# Patient Record
Sex: FEMALE | Race: OTHER | HISPANIC OR LATINO
[De-identification: names, ages, dates, MRNs, and addresses within clinical notes are randomized per-mention and may not be internally consistent; named-entity substitution may affect disease eponyms.]

---

## 2018-04-12 ENCOUNTER — APPOINTMENT (OUTPATIENT)
Dept: HUMAN REPRODUCTION | Facility: CLINIC | Age: 33
End: 2018-04-12
Payer: COMMERCIAL

## 2018-04-12 PROCEDURE — 99215 OFFICE O/P EST HI 40 MIN: CPT | Mod: 25

## 2018-04-12 PROCEDURE — 76830 TRANSVAGINAL US NON-OB: CPT

## 2018-04-12 PROCEDURE — 36415 COLL VENOUS BLD VENIPUNCTURE: CPT

## 2018-04-25 ENCOUNTER — EMERGENCY (EMERGENCY)
Facility: HOSPITAL | Age: 33
LOS: 0 days | Discharge: HOME | End: 2018-04-25
Attending: EMERGENCY MEDICINE | Admitting: EMERGENCY MEDICINE
Payer: COMMERCIAL

## 2018-04-25 VITALS
HEIGHT: 63 IN | TEMPERATURE: 98 F | OXYGEN SATURATION: 99 % | SYSTOLIC BLOOD PRESSURE: 123 MMHG | DIASTOLIC BLOOD PRESSURE: 69 MMHG | RESPIRATION RATE: 18 BRPM | HEART RATE: 67 BPM | WEIGHT: 160.06 LBS

## 2018-04-25 VITALS — DIASTOLIC BLOOD PRESSURE: 72 MMHG | HEART RATE: 68 BPM | SYSTOLIC BLOOD PRESSURE: 125 MMHG

## 2018-04-25 DIAGNOSIS — M54.5 LOW BACK PAIN: ICD-10-CM

## 2018-04-25 DIAGNOSIS — M79.661 PAIN IN RIGHT LOWER LEG: ICD-10-CM

## 2018-04-25 DIAGNOSIS — G89.11 ACUTE PAIN DUE TO TRAUMA: ICD-10-CM

## 2018-04-25 DIAGNOSIS — M54.40 LUMBAGO WITH SCIATICA, UNSPECIFIED SIDE: ICD-10-CM

## 2018-04-25 DIAGNOSIS — Z04.1 ENCOUNTER FOR EXAMINATION AND OBSERVATION FOLLOWING TRANSPORT ACCIDENT: ICD-10-CM

## 2018-04-25 DIAGNOSIS — M79.662 PAIN IN LEFT LOWER LEG: ICD-10-CM

## 2018-04-25 PROCEDURE — 73590 X-RAY EXAM OF LOWER LEG: CPT | Mod: 26,RT

## 2018-04-25 PROCEDURE — 72131 CT LUMBAR SPINE W/O DYE: CPT | Mod: 26

## 2018-04-25 PROCEDURE — 99284 EMERGENCY DEPT VISIT MOD MDM: CPT

## 2018-04-25 RX ORDER — CYCLOBENZAPRINE HYDROCHLORIDE 10 MG/1
10 TABLET, FILM COATED ORAL ONCE
Qty: 0 | Refills: 0 | Status: COMPLETED | OUTPATIENT
Start: 2018-04-25 | End: 2018-04-25

## 2018-04-25 RX ORDER — IBUPROFEN 200 MG
600 TABLET ORAL ONCE
Qty: 0 | Refills: 0 | Status: COMPLETED | OUTPATIENT
Start: 2018-04-25 | End: 2018-04-25

## 2018-04-25 RX ORDER — CYCLOBENZAPRINE HYDROCHLORIDE 10 MG/1
1 TABLET, FILM COATED ORAL
Qty: 15 | Refills: 0
Start: 2018-04-25

## 2018-04-25 RX ORDER — IBUPROFEN 200 MG
1 TABLET ORAL
Qty: 30 | Refills: 0
Start: 2018-04-25

## 2018-04-25 RX ORDER — LIDOCAINE 4 G/100G
1 CREAM TOPICAL ONCE
Qty: 0 | Refills: 0 | Status: COMPLETED | OUTPATIENT
Start: 2018-04-25 | End: 2018-04-25

## 2018-04-25 RX ADMIN — CYCLOBENZAPRINE HYDROCHLORIDE 10 MILLIGRAM(S): 10 TABLET, FILM COATED ORAL at 10:52

## 2018-04-25 RX ADMIN — LIDOCAINE 1 PATCH: 4 CREAM TOPICAL at 10:45

## 2018-04-25 RX ADMIN — Medication 600 MILLIGRAM(S): at 10:45
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
## 2018-04-25 NOTE — ED PROVIDER NOTE - NEUROLOGICAL, MLM
No focal deficits, no motor or sensory deficits. MAEx4. No focal deficits, no motor or sensory deficits. MAEx4 with 5/5 strength throughout, Cranial II - XII intact. No decreased sensation. No focal deficits, no motor or sensory deficits. MAEx4 with 5/5 strength throughout, Cranial II - XII intact. No decreased sensation. Nml gait.

## 2018-04-25 NOTE — ED PROVIDER NOTE - PROGRESS NOTE DETAILS
AJML: pt improved. walking well. workup neg. dc home. All results discussed with the patient, and a copy of results has been provided. Patient is comfortable with dc plan for home. Opportunity for questions was provided and all questions have been adressed.

## 2018-05-14 ENCOUNTER — APPOINTMENT (OUTPATIENT)
Dept: HUMAN REPRODUCTION | Facility: CLINIC | Age: 33
End: 2018-05-14

## 2018-09-04 ENCOUNTER — EMERGENCY (EMERGENCY)
Facility: HOSPITAL | Age: 33
LOS: 0 days | Discharge: HOME | End: 2018-09-05
Attending: EMERGENCY MEDICINE | Admitting: EMERGENCY MEDICINE
Payer: COMMERCIAL

## 2018-09-04 VITALS
TEMPERATURE: 99 F | HEIGHT: 63 IN | DIASTOLIC BLOOD PRESSURE: 42 MMHG | WEIGHT: 145.06 LBS | SYSTOLIC BLOOD PRESSURE: 117 MMHG | HEART RATE: 63 BPM | RESPIRATION RATE: 18 BRPM | OXYGEN SATURATION: 100 %

## 2018-09-04 NOTE — ED ADULT TRIAGE NOTE - CHIEF COMPLAINT QUOTE
pt presents to Ed with complaints of right foot pain. pt states pain started suddenly at approximately 1930 this evening. pt did not take medication for pain, but did apply ice. pt states pain started while standing. denies trauma to area.

## 2018-09-05 PROCEDURE — 73610 X-RAY EXAM OF ANKLE: CPT | Mod: 26,RT

## 2018-09-05 PROCEDURE — 99284 EMERGENCY DEPT VISIT MOD MDM: CPT | Mod: 25

## 2018-09-05 PROCEDURE — 73630 X-RAY EXAM OF FOOT: CPT | Mod: 26,RT

## 2018-09-05 RX ORDER — IBUPROFEN 200 MG
600 TABLET ORAL ONCE
Qty: 0 | Refills: 0 | Status: COMPLETED | OUTPATIENT
Start: 2018-09-05 | End: 2018-09-05

## 2018-09-05 RX ADMIN — Medication 600 MILLIGRAM(S): at 00:30

## 2018-09-05 NOTE — ED ADULT NURSE NOTE - NSIMPLEMENTINTERV_GEN_ALL_ED
Implemented All Universal Safety Interventions:  [place] to call system. Call bell, personal items and telephone within reach. Instruct patient to call for assistance. Room bathroom lighting operational. Non-slip footwear when patient is off stretcher. Physically safe environment: no spills, clutter or unnecessary equipment. Stretcher in lowest position, wheels locked, appropriate side rails in place.

## 2018-09-05 NOTE — ED ADULT NURSE NOTE - OBJECTIVE STATEMENT
pt complains of right foot pain. pt did not fall or injury foot. pt states she did not workout recently. no deformity or edema noted. positive pedal pulses 10/10 pain. states it started hurting around 7pm tonight while standing at work

## 2018-09-05 NOTE — ED PROVIDER NOTE - OBJECTIVE STATEMENT
34 y/o female in ED c/o sudden onset of right foot pain x 5 hours while standing.   pt states that she is a  and stands all day.  no meds taken for pain.   pt denies any trauma.   denies any other complaints.

## 2018-09-06 DIAGNOSIS — M79.671 PAIN IN RIGHT FOOT: ICD-10-CM

## 2018-10-09 ENCOUNTER — EMERGENCY (EMERGENCY)
Facility: HOSPITAL | Age: 33
LOS: 0 days | Discharge: HOME | End: 2018-10-09
Attending: EMERGENCY MEDICINE | Admitting: EMERGENCY MEDICINE
Payer: COMMERCIAL

## 2018-10-09 VITALS
HEART RATE: 56 BPM | RESPIRATION RATE: 18 BRPM | OXYGEN SATURATION: 100 % | SYSTOLIC BLOOD PRESSURE: 108 MMHG | DIASTOLIC BLOOD PRESSURE: 46 MMHG | TEMPERATURE: 99 F

## 2018-10-09 VITALS — HEIGHT: 63 IN | WEIGHT: 143.08 LBS

## 2018-10-09 DIAGNOSIS — R94.31 ABNORMAL ELECTROCARDIOGRAM [ECG] [EKG]: ICD-10-CM

## 2018-10-09 DIAGNOSIS — R06.00 DYSPNEA, UNSPECIFIED: ICD-10-CM

## 2018-10-09 DIAGNOSIS — R07.9 CHEST PAIN, UNSPECIFIED: ICD-10-CM

## 2018-10-09 DIAGNOSIS — R06.02 SHORTNESS OF BREATH: ICD-10-CM

## 2018-10-09 LAB
ALBUMIN SERPL ELPH-MCNC: 3.6 G/DL — SIGNIFICANT CHANGE UP (ref 3.3–5)
ALP SERPL-CCNC: 48 U/L — SIGNIFICANT CHANGE UP (ref 40–120)
ALT FLD-CCNC: 24 U/L — SIGNIFICANT CHANGE UP (ref 12–78)
ANION GAP SERPL CALC-SCNC: 6 MMOL/L — SIGNIFICANT CHANGE UP (ref 5–17)
ANISOCYTOSIS BLD QL: SLIGHT — SIGNIFICANT CHANGE UP
APPEARANCE UR: CLEAR — SIGNIFICANT CHANGE UP
AST SERPL-CCNC: 17 U/L — SIGNIFICANT CHANGE UP (ref 15–37)
BASOPHILS # BLD AUTO: 0.03 K/UL — SIGNIFICANT CHANGE UP (ref 0–0.2)
BASOPHILS NFR BLD AUTO: 0.3 % — SIGNIFICANT CHANGE UP (ref 0–2)
BILIRUB SERPL-MCNC: 0.8 MG/DL — SIGNIFICANT CHANGE UP (ref 0.2–1.2)
BILIRUB UR-MCNC: NEGATIVE — SIGNIFICANT CHANGE UP
BUN SERPL-MCNC: 9 MG/DL — SIGNIFICANT CHANGE UP (ref 7–23)
CALCIUM SERPL-MCNC: 8.6 MG/DL — SIGNIFICANT CHANGE UP (ref 8.5–10.1)
CHLORIDE SERPL-SCNC: 107 MMOL/L — SIGNIFICANT CHANGE UP (ref 96–108)
CO2 SERPL-SCNC: 30 MMOL/L — SIGNIFICANT CHANGE UP (ref 22–31)
COLOR SPEC: YELLOW — SIGNIFICANT CHANGE UP
CREAT SERPL-MCNC: 0.58 MG/DL — SIGNIFICANT CHANGE UP (ref 0.5–1.3)
D DIMER BLD IA.RAPID-MCNC: <150 NG/ML DDU — SIGNIFICANT CHANGE UP
DIFF PNL FLD: NEGATIVE — SIGNIFICANT CHANGE UP
EOSINOPHIL # BLD AUTO: 0.09 K/UL — SIGNIFICANT CHANGE UP (ref 0–0.5)
EOSINOPHIL NFR BLD AUTO: 1 % — SIGNIFICANT CHANGE UP (ref 0–6)
GLUCOSE SERPL-MCNC: 83 MG/DL — SIGNIFICANT CHANGE UP (ref 70–99)
GLUCOSE UR QL: NEGATIVE MG/DL — SIGNIFICANT CHANGE UP
HCT VFR BLD CALC: 31.3 % — LOW (ref 34.5–45)
HGB BLD-MCNC: 11.5 G/DL — SIGNIFICANT CHANGE UP (ref 11.5–15.5)
HYPOCHROMIA BLD QL: SIGNIFICANT CHANGE UP
IMM GRANULOCYTES NFR BLD AUTO: 0.4 % — SIGNIFICANT CHANGE UP (ref 0–1.5)
KETONES UR-MCNC: NEGATIVE — SIGNIFICANT CHANGE UP
LEUKOCYTE ESTERASE UR-ACNC: NEGATIVE — SIGNIFICANT CHANGE UP
LYMPHOCYTES # BLD AUTO: 2.18 K/UL — SIGNIFICANT CHANGE UP (ref 1–3.3)
LYMPHOCYTES # BLD AUTO: 24.4 % — SIGNIFICANT CHANGE UP (ref 13–44)
MACROCYTES BLD QL: SLIGHT — SIGNIFICANT CHANGE UP
MANUAL SMEAR VERIFICATION: SIGNIFICANT CHANGE UP
MCHC RBC-ENTMCNC: 22.9 PG — LOW (ref 27–34)
MCHC RBC-ENTMCNC: 36.7 GM/DL — HIGH (ref 32–36)
MCV RBC AUTO: 62.4 FL — LOW (ref 80–100)
MICROCYTES BLD QL: SLIGHT — SIGNIFICANT CHANGE UP
MONOCYTES # BLD AUTO: 0.5 K/UL — SIGNIFICANT CHANGE UP (ref 0–0.9)
MONOCYTES NFR BLD AUTO: 5.6 % — SIGNIFICANT CHANGE UP (ref 2–14)
NEUTROPHILS # BLD AUTO: 6.09 K/UL — SIGNIFICANT CHANGE UP (ref 1.8–7.4)
NEUTROPHILS NFR BLD AUTO: 68.3 % — SIGNIFICANT CHANGE UP (ref 43–77)
NITRITE UR-MCNC: NEGATIVE — SIGNIFICANT CHANGE UP
NRBC # BLD: 0 /100 WBCS — SIGNIFICANT CHANGE UP (ref 0–0)
PH UR: 7 — SIGNIFICANT CHANGE UP (ref 5–8)
PLAT MORPH BLD: NORMAL — SIGNIFICANT CHANGE UP
PLATELET # BLD AUTO: 191 K/UL — SIGNIFICANT CHANGE UP (ref 150–400)
POLYCHROMASIA BLD QL SMEAR: SLIGHT — SIGNIFICANT CHANGE UP
POTASSIUM SERPL-MCNC: 4.2 MMOL/L — SIGNIFICANT CHANGE UP (ref 3.5–5.3)
POTASSIUM SERPL-SCNC: 4.2 MMOL/L — SIGNIFICANT CHANGE UP (ref 3.5–5.3)
PROT SERPL-MCNC: 6.9 GM/DL — SIGNIFICANT CHANGE UP (ref 6–8.3)
PROT UR-MCNC: NEGATIVE MG/DL — SIGNIFICANT CHANGE UP
RBC # BLD: 5.02 M/UL — SIGNIFICANT CHANGE UP (ref 3.8–5.2)
RBC # FLD: 15.5 % — HIGH (ref 10.3–14.5)
RBC BLD AUTO: ABNORMAL
SODIUM SERPL-SCNC: 143 MMOL/L — SIGNIFICANT CHANGE UP (ref 135–145)
SP GR SPEC: 1.01 — SIGNIFICANT CHANGE UP (ref 1.01–1.02)
TARGETS BLD QL SMEAR: SIGNIFICANT CHANGE UP
UROBILINOGEN FLD QL: 1 MG/DL
WBC # BLD: 8.93 K/UL — SIGNIFICANT CHANGE UP (ref 3.8–10.5)
WBC # FLD AUTO: 8.93 K/UL — SIGNIFICANT CHANGE UP (ref 3.8–10.5)

## 2018-10-09 PROCEDURE — 93010 ELECTROCARDIOGRAM REPORT: CPT

## 2018-10-09 PROCEDURE — 99285 EMERGENCY DEPT VISIT HI MDM: CPT

## 2018-10-09 PROCEDURE — 71045 X-RAY EXAM CHEST 1 VIEW: CPT | Mod: 26

## 2018-10-09 RX ORDER — IPRATROPIUM/ALBUTEROL SULFATE 18-103MCG
3 AEROSOL WITH ADAPTER (GRAM) INHALATION ONCE
Qty: 0 | Refills: 0 | Status: DISCONTINUED | OUTPATIENT
Start: 2018-10-09 | End: 2018-10-09

## 2018-10-09 RX ORDER — SODIUM CHLORIDE 9 MG/ML
1000 INJECTION INTRAMUSCULAR; INTRAVENOUS; SUBCUTANEOUS ONCE
Qty: 0 | Refills: 0 | Status: DISCONTINUED | OUTPATIENT
Start: 2018-10-09 | End: 2018-10-09

## 2018-10-09 RX ORDER — KETOROLAC TROMETHAMINE 30 MG/ML
30 SYRINGE (ML) INJECTION ONCE
Qty: 0 | Refills: 0 | Status: DISCONTINUED | OUTPATIENT
Start: 2018-10-09 | End: 2018-10-09

## 2018-10-09 RX ADMIN — Medication 30 MILLIGRAM(S): at 13:22

## 2018-10-09 NOTE — ED ADULT TRIAGE NOTE - CHIEF COMPLAINT QUOTE
pt c/o back pain x 3 days. dx w/ pilonidal cyst at urgent care pt presents to ED c/o L sided rib pain and sob x 2 days denies trauma/injury

## 2018-10-09 NOTE — ED ADULT NURSE NOTE - NSIMPLEMENTINTERV_GEN_ALL_ED
Implemented All Universal Safety Interventions:  [place] to call system. Call bell, personal items and telephone within reach. Instruct patient to call for assistance. Room bathroom lighting operational. Non-slip footwear when patient is off stretcher. Physically safe environment: no spills, clutter or unnecessary equipment. Stretcher in lowest position, wheels locked, appropriate side rails in place.

## 2018-10-09 NOTE — ED STATDOCS - CARE PLAN
Principal Discharge DX:	Dyspnea, unspecified type  Secondary Diagnosis:	Chest pain, unspecified type

## 2018-10-09 NOTE — ED STATDOCS - MUSCULOSKELETAL, MLM
range of motion is not limited. TTP left chest wall. range of motion is not limited. mild TTP left chest wall.

## 2018-10-09 NOTE — ED STATDOCS - OBJECTIVE STATEMENT
32 y/o female with no significant PMHx presents to the ED c/o SOB x3 days. +cough, +pain under left breast. Pt was sent to ED by chiropractor for SOB. Has not taken any medication for pain. Occasional EtOH use. Smoker. Occasional Marijuana use. NKDA. LNMP: 2 weeks ago.

## 2018-10-09 NOTE — ED STATDOCS - ATTENDING CONTRIBUTION TO CARE
I, Valencia Darling MD,  performed the initial face to face bedside interview with this patient regarding history of present illness, review of symptoms and relevant past medical, social and family history.  I completed an independent physical examination.  I was the initial provider who evaluated this patient. I have signed out the follow up of any pending tests (i.e. labs, radiological studies) to the ACP.  I have communicated the patient’s plan of care and disposition with the ACP.  The history, relevant review of systems, past medical and surgical history, medical decision making, and physical examination was documented by the scribe in my presence and I attest to the accuracy of the documentation.

## 2018-10-09 NOTE — ED STATDOCS - PROGRESS NOTE DETAILS
signed Melissa Juarez PA-C Pt seen initially in intake by Dr. Darling signed Melissa Juarez PA-C Pt seen initially in intake by Dr. Darling  33F sent for eval for left sided CP and SOB for a few days. pt notes the discomfort is worsened when she moved her LUE around. No significant findings on imaging, or EKG. Labs show mild anemia, pt notes she has sickle cell trait.  recommend outpt f/u PMD. return precautions given. PMD [facility]. Pt given copy of lab results. Pt feeling well, agrees with DC and plan of care.

## 2018-11-02 ENCOUNTER — APPOINTMENT (OUTPATIENT)
Dept: INTERNAL MEDICINE | Facility: CLINIC | Age: 33
End: 2018-11-02

## 2019-02-27 ENCOUNTER — EMERGENCY (EMERGENCY)
Facility: HOSPITAL | Age: 34
LOS: 0 days | Discharge: HOME | End: 2019-02-27
Attending: EMERGENCY MEDICINE | Admitting: EMERGENCY MEDICINE
Payer: COMMERCIAL

## 2019-02-27 VITALS
OXYGEN SATURATION: 100 % | SYSTOLIC BLOOD PRESSURE: 132 MMHG | TEMPERATURE: 99 F | HEART RATE: 68 BPM | DIASTOLIC BLOOD PRESSURE: 64 MMHG | RESPIRATION RATE: 16 BRPM

## 2019-02-27 VITALS — HEIGHT: 63 IN | WEIGHT: 139.99 LBS

## 2019-02-27 DIAGNOSIS — M54.9 DORSALGIA, UNSPECIFIED: ICD-10-CM

## 2019-02-27 DIAGNOSIS — M54.89 OTHER DORSALGIA: ICD-10-CM
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
## 2019-02-27 DIAGNOSIS — M54.5 LOW BACK PAIN: ICD-10-CM
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
## 2019-02-27 PROCEDURE — 71045 X-RAY EXAM CHEST 1 VIEW: CPT | Mod: 26

## 2019-02-27 PROCEDURE — 99283 EMERGENCY DEPT VISIT LOW MDM: CPT

## 2019-02-27 RX ORDER — CYCLOBENZAPRINE HYDROCHLORIDE 10 MG/1
1 TABLET, FILM COATED ORAL
Qty: 15 | Refills: 0
Start: 2019-02-27 | End: 2019-03-03

## 2019-02-27 RX ORDER — IBUPROFEN 200 MG
1 TABLET ORAL
Qty: 28 | Refills: 0
Start: 2019-02-27 | End: 2019-03-05

## 2019-02-27 RX ORDER — CYCLOBENZAPRINE HYDROCHLORIDE 10 MG/1
10 TABLET, FILM COATED ORAL ONCE
Qty: 0 | Refills: 0 | Status: COMPLETED | OUTPATIENT
Start: 2019-02-27 | End: 2019-02-27

## 2019-02-27 RX ORDER — KETOROLAC TROMETHAMINE 30 MG/ML
30 SYRINGE (ML) INJECTION ONCE
Qty: 0 | Refills: 0 | Status: DISCONTINUED | OUTPATIENT
Start: 2019-02-27 | End: 2019-02-27

## 2019-02-27 RX ADMIN — CYCLOBENZAPRINE HYDROCHLORIDE 10 MILLIGRAM(S): 10 TABLET, FILM COATED ORAL at 19:03

## 2019-02-27 RX ADMIN — Medication 30 MILLIGRAM(S): at 19:03
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
## 2019-02-27 NOTE — ED STATDOCS - PROGRESS NOTE DETAILS
CXR negative.  Pt feeling better after medications.  Plan to d/c home with motrin, muscle relaxers, f/u PMD and ortho spine.  Pt agreeable to d/c and plan of care, all questions answered, return precautions given  Vianca Estevez PA-C
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
## 2019-03-11 ENCOUNTER — EMERGENCY (EMERGENCY)
Facility: HOSPITAL | Age: 34
LOS: 0 days | Discharge: HOME | End: 2019-03-11
Attending: EMERGENCY MEDICINE | Admitting: EMERGENCY MEDICINE
Payer: SELF-PAY

## 2019-03-11 VITALS
HEART RATE: 88 BPM | OXYGEN SATURATION: 99 % | TEMPERATURE: 98 F | DIASTOLIC BLOOD PRESSURE: 87 MMHG | RESPIRATION RATE: 16 BRPM | SYSTOLIC BLOOD PRESSURE: 146 MMHG

## 2019-03-11 DIAGNOSIS — N93.9 ABNORMAL UTERINE AND VAGINAL BLEEDING, UNSPECIFIED: ICD-10-CM

## 2019-03-11 DIAGNOSIS — N94.6 DYSMENORRHEA, UNSPECIFIED: ICD-10-CM

## 2019-03-11 LAB
ANION GAP SERPL CALC-SCNC: 6 MMOL/L — SIGNIFICANT CHANGE UP (ref 5–17)
ANISOCYTOSIS BLD QL: SLIGHT — SIGNIFICANT CHANGE UP
APPEARANCE UR: CLEAR — SIGNIFICANT CHANGE UP
BACTERIA # UR AUTO: ABNORMAL
BASOPHILS # BLD AUTO: 0.04 K/UL — SIGNIFICANT CHANGE UP (ref 0–0.2)
BASOPHILS NFR BLD AUTO: 0.5 % — SIGNIFICANT CHANGE UP (ref 0–2)
BILIRUB UR-MCNC: NEGATIVE — SIGNIFICANT CHANGE UP
BLD GP AB SCN SERPL QL: SIGNIFICANT CHANGE UP
BUN SERPL-MCNC: 16 MG/DL — SIGNIFICANT CHANGE UP (ref 7–23)
CALCIUM SERPL-MCNC: 8.5 MG/DL — SIGNIFICANT CHANGE UP (ref 8.5–10.1)
CHLORIDE SERPL-SCNC: 104 MMOL/L — SIGNIFICANT CHANGE UP (ref 96–108)
CO2 SERPL-SCNC: 27 MMOL/L — SIGNIFICANT CHANGE UP (ref 22–31)
COLOR SPEC: YELLOW — SIGNIFICANT CHANGE UP
CREAT SERPL-MCNC: 0.6 MG/DL — SIGNIFICANT CHANGE UP (ref 0.5–1.3)
DIFF PNL FLD: ABNORMAL
EOSINOPHIL # BLD AUTO: 0.13 K/UL — SIGNIFICANT CHANGE UP (ref 0–0.5)
EOSINOPHIL NFR BLD AUTO: 1.6 % — SIGNIFICANT CHANGE UP (ref 0–6)
EPI CELLS # UR: SIGNIFICANT CHANGE UP
GLUCOSE SERPL-MCNC: 93 MG/DL — SIGNIFICANT CHANGE UP (ref 70–99)
GLUCOSE UR QL: NEGATIVE MG/DL — SIGNIFICANT CHANGE UP
HCG SERPL-ACNC: <1 MIU/ML — SIGNIFICANT CHANGE UP
HCT VFR BLD CALC: 30.1 % — LOW (ref 34.5–45)
HGB BLD-MCNC: 11.1 G/DL — LOW (ref 11.5–15.5)
HYPOCHROMIA BLD QL: SIGNIFICANT CHANGE UP
IMM GRANULOCYTES NFR BLD AUTO: 0.6 % — SIGNIFICANT CHANGE UP (ref 0–1.5)
KETONES UR-MCNC: NEGATIVE — SIGNIFICANT CHANGE UP
LEUKOCYTE ESTERASE UR-ACNC: ABNORMAL
LYMPHOCYTES # BLD AUTO: 2.19 K/UL — SIGNIFICANT CHANGE UP (ref 1–3.3)
LYMPHOCYTES # BLD AUTO: 26.2 % — SIGNIFICANT CHANGE UP (ref 13–44)
MACROCYTES BLD QL: SLIGHT — SIGNIFICANT CHANGE UP
MANUAL SMEAR VERIFICATION: SIGNIFICANT CHANGE UP
MCHC RBC-ENTMCNC: 22.9 PG — LOW (ref 27–34)
MCHC RBC-ENTMCNC: 36.9 GM/DL — HIGH (ref 32–36)
MCV RBC AUTO: 62.1 FL — LOW (ref 80–100)
MICROCYTES BLD QL: SIGNIFICANT CHANGE UP
MONOCYTES # BLD AUTO: 0.77 K/UL — SIGNIFICANT CHANGE UP (ref 0–0.9)
MONOCYTES NFR BLD AUTO: 9.2 % — SIGNIFICANT CHANGE UP (ref 2–14)
NEUTROPHILS # BLD AUTO: 5.19 K/UL — SIGNIFICANT CHANGE UP (ref 1.8–7.4)
NEUTROPHILS NFR BLD AUTO: 61.9 % — SIGNIFICANT CHANGE UP (ref 43–77)
NITRITE UR-MCNC: NEGATIVE — SIGNIFICANT CHANGE UP
NRBC # BLD: 0 /100 WBCS — SIGNIFICANT CHANGE UP (ref 0–0)
PH UR: 6 — SIGNIFICANT CHANGE UP (ref 5–8)
PLAT MORPH BLD: NORMAL — SIGNIFICANT CHANGE UP
PLATELET # BLD AUTO: 193 K/UL — SIGNIFICANT CHANGE UP (ref 150–400)
POLYCHROMASIA BLD QL SMEAR: SLIGHT — SIGNIFICANT CHANGE UP
POTASSIUM SERPL-MCNC: 3.7 MMOL/L — SIGNIFICANT CHANGE UP (ref 3.5–5.3)
POTASSIUM SERPL-SCNC: 3.7 MMOL/L — SIGNIFICANT CHANGE UP (ref 3.5–5.3)
PROT UR-MCNC: NEGATIVE MG/DL — SIGNIFICANT CHANGE UP
RBC # BLD: 4.85 M/UL — SIGNIFICANT CHANGE UP (ref 3.8–5.2)
RBC # FLD: 15.3 % — HIGH (ref 10.3–14.5)
RBC BLD AUTO: ABNORMAL
RBC CASTS # UR COMP ASSIST: >50 /HPF (ref 0–4)
SODIUM SERPL-SCNC: 137 MMOL/L — SIGNIFICANT CHANGE UP (ref 135–145)
SP GR SPEC: 1.01 — SIGNIFICANT CHANGE UP (ref 1.01–1.02)
TARGETS BLD QL SMEAR: SIGNIFICANT CHANGE UP
TYPE + AB SCN PNL BLD: SIGNIFICANT CHANGE UP
UROBILINOGEN FLD QL: NEGATIVE MG/DL — SIGNIFICANT CHANGE UP
WBC # BLD: 8.37 K/UL — SIGNIFICANT CHANGE UP (ref 3.8–10.5)
WBC # FLD AUTO: 8.37 K/UL — SIGNIFICANT CHANGE UP (ref 3.8–10.5)
WBC UR QL: SIGNIFICANT CHANGE UP

## 2019-03-11 PROCEDURE — 99285 EMERGENCY DEPT VISIT HI MDM: CPT | Mod: 25

## 2019-03-11 PROCEDURE — 76830 TRANSVAGINAL US NON-OB: CPT | Mod: 26

## 2019-03-11 RX ORDER — ACETAMINOPHEN 500 MG
650 TABLET ORAL ONCE
Qty: 0 | Refills: 0 | Status: COMPLETED | OUTPATIENT
Start: 2019-03-11 | End: 2019-03-11

## 2019-03-11 RX ORDER — SODIUM CHLORIDE 9 MG/ML
1000 INJECTION INTRAMUSCULAR; INTRAVENOUS; SUBCUTANEOUS ONCE
Qty: 0 | Refills: 0 | Status: COMPLETED | OUTPATIENT
Start: 2019-03-11 | End: 2019-03-11

## 2019-03-11 RX ORDER — KETOROLAC TROMETHAMINE 30 MG/ML
30 SYRINGE (ML) INJECTION ONCE
Qty: 0 | Refills: 0 | Status: DISCONTINUED | OUTPATIENT
Start: 2019-03-11 | End: 2019-03-11

## 2019-03-11 RX ADMIN — SODIUM CHLORIDE 2000 MILLILITER(S): 9 INJECTION INTRAMUSCULAR; INTRAVENOUS; SUBCUTANEOUS at 01:47

## 2019-03-11 RX ADMIN — Medication 30 MILLIGRAM(S): at 02:49

## 2019-03-11 RX ADMIN — Medication 650 MILLIGRAM(S): at 02:49

## 2019-03-11 NOTE — ED ADULT NURSE NOTE - NSIMPLEMENTINTERV_GEN_ALL_ED
Implemented All Universal Safety Interventions:  [place] to call system. Call bell, personal items and telephone within reach. Instruct patient to call for assistance. Room bathroom lighting operational. Non-slip footwear when patient is off stretcher. Physically safe environment: no spills, clutter or unnecessary equipment. Stretcher in lowest position, wheels locked, appropriate side rails in place.

## 2019-03-11 NOTE — ED ADULT NURSE REASSESSMENT NOTE - NS ED NURSE REASSESS COMMENT FT1
patient discharged home. written and verbal discharge and followup instructions given to patient, patient verbalized back understanding. iv taken out. copy of lab and ultrasound given to patient. patient discharged from ED  at 2845

## 2019-03-11 NOTE — ED PROVIDER NOTE - CARE PROVIDER_API CALL
Jimbo Miguel)  Obstetrics and Gynecology  [address] [place] [address]  Phone: [phone]  Fax: [phone]  Follow Up Time:

## 2019-03-11 NOTE — ED PROVIDER NOTE - OBJECTIVE STATEMENT
34 y/o  Female with no significant PMHx p/w severe vaginal bleeding over the past 6 hours.  Pt started having spotting 3 days ago and it became heavy last night soaking several pads.  Pt denies seeing clots or tissue.  LMP was 19.  Pt is sexually active and hasn't taken a pregnancy test.  Pt started having bilateral pelvic cramping this AM.

## 2019-03-11 NOTE — ED PROVIDER NOTE - NSFOLLOWUPINSTRUCTIONS_ED_ALL_ED_FT
.dcvagbleed Return to the ER for worsening/persistent bleeding, fever, shortness of breath, chest pain, dizziness or other concerns.

## 2019-03-11 NOTE — ED ADULT TRIAGE NOTE - CHIEF COMPLAINT QUOTE
Pt presents to ER c/o dark heavy vaginal bleeding. Onset of symptoms began 3 days ago. Pt reports painless bleeding at first and now painful bleeding. LMP 2/26. Denies blood thinners

## 2019-07-15 ENCOUNTER — EMERGENCY (EMERGENCY)
Facility: HOSPITAL | Age: 34
LOS: 0 days | Discharge: HOME | End: 2019-07-15
Attending: EMERGENCY MEDICINE
Payer: SELF-PAY

## 2019-07-15 VITALS
TEMPERATURE: 98 F | OXYGEN SATURATION: 97 % | RESPIRATION RATE: 18 BRPM | HEART RATE: 72 BPM | DIASTOLIC BLOOD PRESSURE: 54 MMHG | SYSTOLIC BLOOD PRESSURE: 106 MMHG

## 2019-07-15 VITALS — HEIGHT: 63 IN | WEIGHT: 139.99 LBS

## 2019-07-15 DIAGNOSIS — S80.12XA CONTUSION OF LEFT LOWER LEG, INITIAL ENCOUNTER: ICD-10-CM
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
## 2019-07-15 DIAGNOSIS — M79.605 PAIN IN LEFT LEG: ICD-10-CM

## 2019-07-15 DIAGNOSIS — M25.50 PAIN IN UNSPECIFIED JOINT: ICD-10-CM

## 2019-07-15 DIAGNOSIS — M25.552 PAIN IN LEFT HIP: ICD-10-CM
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
## 2019-07-15 PROCEDURE — 99285 EMERGENCY DEPT VISIT HI MDM: CPT

## 2019-07-15 PROCEDURE — 73502 X-RAY EXAM HIP UNI 2-3 VIEWS: CPT | Mod: 26,LT

## 2019-07-15 RX ORDER — KETOROLAC TROMETHAMINE 30 MG/ML
60 SYRINGE (ML) INJECTION ONCE
Refills: 0 | Status: DISCONTINUED | OUTPATIENT
Start: 2019-07-15 | End: 2019-07-15

## 2019-07-15 RX ADMIN — Medication 60 MILLIGRAM(S): at 14:56

## 2019-07-15 RX ADMIN — Medication 60 MILLIGRAM(S): at 15:25

## 2019-07-15 NOTE — ED ADULT TRIAGE NOTE - CHIEF COMPLAINT QUOTE
Pt states that she jumped over a fence yesterday and hurt her left hip and leg. Pt complains of difficulty ambulating and 10/10 shooting pain.

## 2019-07-15 NOTE — ED STATDOCS - PROGRESS NOTE DETAILS
32 yo female presenst with L hip and L thigh pain s/p fall on her L hip. Pt states she jumped a fence and fell directly on the leg. Pain with lifting the leg. Pt noticed a bruise to the L posterior and medial thigh. Xr. Reeval. -Fabien Olivier PA-C

## 2019-07-15 NOTE — ED STATDOCS - CARE PLAN
Principal Discharge DX:	Pain of left hip joint  Secondary Diagnosis:	Contusion of left lower leg, initial encounter

## 2019-07-15 NOTE — ED STATDOCS - CLINICAL SUMMARY MEDICAL DECISION MAKING FREE TEXT BOX
Plan for XR, pain control and reevaluate. Most likely contusion and not a fracture. Plan for XR, pain control and reevaluate. Most likely contusion and not a fracture.    Xr unremarkable. Discussed results with pt. Instructed pt to practice RICEe for further symptoms relief.  Pt aware. -Fabien Olivier PA-C

## 2019-07-15 NOTE — ED STATDOCS - ATTENDING CONTRIBUTION TO CARE
I, Vani Medrano, performed the initial face to face bedside interview with this patient regarding history of present illness, review of symptoms and relevant past medical, social and family history.  I completed an independent physical examination.  I was the initial provider who evaluated this patient. I have signed out the follow up of any pending tests (i.e. labs, radiological studies) to the ACP.  I have communicated the patient’s plan of care and disposition with the ACP.  The history, relevant review of systems, past medical and surgical history, medical decision making, and physical examination was documented by the scribe in my presence and I attest to the accuracy of the documentation.

## 2019-07-15 NOTE — ED STATDOCS - OBJECTIVE STATEMENT
32 y/o female with no pertinent PMHx presents to the ED s/p fall yesterday c/o left hip pain. Pt states yesterday she jumped over a fence, landed on her left buttock and has since had left sided hip pain. States she also has left leg soreness and ecchymosis around her hip and leg. States she has had difficulty walking due to pain. LNMP: 1 month ago.

## 2019-07-15 NOTE — ED ADULT NURSE NOTE - NSIMPLEMENTINTERV_GEN_ALL_ED
Implemented All Universal Safety Interventions:  [place] to call system. Call bell, personal items and telephone within reach. Instruct patient to call for assistance. Room bathroom lighting operational. Non-slip footwear when patient is off stretcher. Physically safe environment: no spills, clutter or unnecessary equipment. Stretcher in lowest position, wheels locked, appropriate side rails in place.

## 2019-07-15 NOTE — ED STATDOCS - MUSCULOSKELETAL, MLM
+Decrease ROM left hip secondary to pain. no tenderness laterally on hip or inguinal or pubic bone. Femur along the bone nontender. +medial fleshy part of extremity TTP

## 2019-07-26 ENCOUNTER — APPOINTMENT (OUTPATIENT)
Dept: ORTHOPEDIC SURGERY | Facility: CLINIC | Age: 34
End: 2019-07-26
Payer: SELF-PAY

## 2019-07-26 VITALS
WEIGHT: 150 LBS | BODY MASS INDEX: 26.58 KG/M2 | DIASTOLIC BLOOD PRESSURE: 73 MMHG | HEIGHT: 63 IN | HEART RATE: 73 BPM | SYSTOLIC BLOOD PRESSURE: 117 MMHG

## 2019-07-26 DIAGNOSIS — Z00.00 ENCOUNTER FOR GENERAL ADULT MEDICAL EXAMINATION W/OUT ABNORMAL FINDINGS: ICD-10-CM

## 2019-07-26 PROCEDURE — 72190 X-RAY EXAM OF PELVIS: CPT

## 2019-07-26 PROCEDURE — 99203 OFFICE O/P NEW LOW 30 MIN: CPT

## 2019-07-26 RX ORDER — NAPROXEN 500 MG/1
500 TABLET ORAL
Qty: 60 | Refills: 0 | Status: ACTIVE | COMMUNITY
Start: 2019-03-07

## 2019-07-26 RX ORDER — IBUPROFEN 600 MG/1
600 TABLET, FILM COATED ORAL
Qty: 28 | Refills: 0 | Status: ACTIVE | COMMUNITY
Start: 2019-02-27

## 2019-07-26 RX ORDER — CYCLOBENZAPRINE HYDROCHLORIDE 10 MG/1
10 TABLET, FILM COATED ORAL
Qty: 15 | Refills: 0 | Status: ACTIVE | COMMUNITY
Start: 2019-02-27

## 2019-07-26 NOTE — HISTORY OF PRESENT ILLNESS
[Pain Location] : pain [7] : a current pain level of 7/10 [10] : a maximum pain level of 10/10 [Standing] : standing [Intermit.] : ~He/She~ states the symptoms seem to be intermittent [Hip Movement] : worsened by hip movement [NSAIDs] : relieved by nonsteroidal anti-inflammatory drugs [Worsening] : worsening [de-identified] : Patient is a 32yo female who presents with left hip pain after she fell on 7/14/19 from a fence  Pain radiates into her left groin.  Xrays of the left hip were taken on 7/15/19 at [facility]. She is taking Naprosyn. Patient is currently doing PT for neck and lower back. She was told "nothing broken" at [place].  discharged wbat.  xrays from that visit showed possible subtle acetabular fx with cross sectional imaging recommended\par \par The patient's past medical history, past surgical history, medications, allergies, and social history were reviewed by me today with the patient and documented accordingly. In addition, the patient's family history, which is noncontributory to this visit, was also reviewed.

## 2019-07-26 NOTE — DISCUSSION/SUMMARY
[de-identified] : 33-year-old female possible left hip subluxation versus dislocation. There is a possible nondisplaced posterior wall fracture. Given crutches today nonweightbearing. We'll obtain an MRI to see cross-sectional imaging evaluated posterior walls all soft tissues including cartilage and labrum. She will followup after MRI. All questions answered

## 2019-08-11 ENCOUNTER — EMERGENCY (EMERGENCY)
Facility: HOSPITAL | Age: 34
LOS: 0 days | Discharge: HOME | End: 2019-08-11
Payer: COMMERCIAL

## 2019-08-11 VITALS — HEIGHT: 63 IN | WEIGHT: 139.99 LBS

## 2019-08-11 VITALS
DIASTOLIC BLOOD PRESSURE: 58 MMHG | TEMPERATURE: 99 F | RESPIRATION RATE: 16 BRPM | HEART RATE: 78 BPM | OXYGEN SATURATION: 100 % | SYSTOLIC BLOOD PRESSURE: 123 MMHG

## 2019-08-11 DIAGNOSIS — R11.0 NAUSEA: ICD-10-CM

## 2019-08-11 DIAGNOSIS — Z32.02 ENCOUNTER FOR PREGNANCY TEST, RESULT NEGATIVE: ICD-10-CM

## 2019-08-11 DIAGNOSIS — Z79.899 OTHER LONG TERM (CURRENT) DRUG THERAPY: ICD-10-CM

## 2019-08-11 DIAGNOSIS — N64.4 MASTODYNIA: ICD-10-CM

## 2019-08-11 PROCEDURE — 81025 URINE PREGNANCY TEST: CPT | Mod: 59

## 2019-08-11 PROCEDURE — 99282 EMERGENCY DEPT VISIT SF MDM: CPT

## 2019-08-11 PROCEDURE — 99282 EMERGENCY DEPT VISIT SF MDM: CPT | Mod: 25

## 2019-08-11 NOTE — ED STATDOCS - PROGRESS NOTE DETAILS
35 yo female just back from vacation concern she might be pregnant, LMP last month. pt denies any vaginal discharge, vaginal bleeding, abdominal pain, (+) tender breast. plan: Urine HCG and fu with pt own ob/gyn. -Mehreen Barry PA-C pt aware of her CG will fu with her OB/GYN tomorrow. pt agrees with plan and well appearing on dc. -Mehreen Barry PA-C

## 2019-08-11 NOTE — ED STATDOCS - NS_ ATTENDINGSCRIBEDETAILS _ED_A_ED_FT
Amira Isaac MD: The history, relevant review of systems, past medical and surgical history, medical decision making, and physical examination was documented by the scribe in my presence and I attest to the accuracy of the documentation.

## 2019-08-11 NOTE — ED STATDOCS - OBJECTIVE STATEMENT
35 y/o female with no PMHx presents to the ED c/o possible pregnancy. Pt reports spotting last month. Pt with recent travel to [place] for 10 days where she became sick, and had evaluation at the ED. Pt had US while in ED suggesting thickening change in uterine walls, though pregnancy test was negative. +Nausea. +Breast tenderness. LNMP: Late June. OB/GYN. Pharmacy: [facility]

## 2019-08-11 NOTE — ED STATDOCS - CLINICAL SUMMARY MEDICAL DECISION MAKING FREE TEXT BOX
35 y/o female with missed pregnancy. Will confirm pregnancy with UCG testing and follow-up with GYN.

## 2019-08-11 NOTE — ED STATDOCS - NSFOLLOWUPINSTRUCTIONS_ED_ALL_ED_FT
ED evaluation and management discussed with the patient and family (if available) in detail.  Close PMD follow up encouraged.  Strict ED return instructions discussed in detail and patient given the opportunity to ask any questions about their discharge diagnosis and instructions. Patient verbalized understanding.  Follow up with your OB/GYN tomorrow

## 2019-08-30 ENCOUNTER — APPOINTMENT (OUTPATIENT)
Dept: MRI IMAGING | Facility: CLINIC | Age: 34
End: 2019-08-30
Payer: COMMERCIAL

## 2019-08-30 ENCOUNTER — OUTPATIENT (OUTPATIENT)
Dept: OUTPATIENT SERVICES | Facility: HOSPITAL | Age: 34
LOS: 1 days | End: 2019-08-30
Payer: COMMERCIAL

## 2019-08-30 DIAGNOSIS — S32.425A NONDISPLACED FRACTURE OF POSTERIOR WALL OF LEFT ACETABULUM, INITIAL ENCOUNTER FOR CLOSED FRACTURE: ICD-10-CM

## 2019-08-30 DIAGNOSIS — Z00.8 ENCOUNTER FOR OTHER GENERAL EXAMINATION: ICD-10-CM

## 2019-08-30 PROCEDURE — 73721 MRI JNT OF LWR EXTRE W/O DYE: CPT | Mod: 26,LT

## 2019-08-30 PROCEDURE — 73721 MRI JNT OF LWR EXTRE W/O DYE: CPT

## 2020-02-26 ENCOUNTER — APPOINTMENT (OUTPATIENT)
Dept: ORTHOPEDIC SURGERY | Facility: CLINIC | Age: 35
End: 2020-02-26
Payer: COMMERCIAL

## 2020-02-26 DIAGNOSIS — S32.425A NONDISPLACED FRACTURE OF POSTERIOR WALL OF LEFT ACETABULUM, INITIAL ENCOUNTER FOR CLOSED FRACTURE: ICD-10-CM

## 2020-02-26 PROCEDURE — 99213 OFFICE O/P EST LOW 20 MIN: CPT

## 2020-02-26 NOTE — DISCUSSION/SUMMARY
[de-identified] : 34-year-old female 8 months status post left hip dislocation. She is continuing to experience pain. I like to obtain a CT scan to evaluate the posterior wall as well as intra-articular loose bodies. She will followup after CT scan

## 2020-02-26 NOTE — HISTORY OF PRESENT ILLNESS
[de-identified] : Patient is a 32yo female who presents with left hip pain after she fell on 7/14/19 from a fence Pain radiates into her left groin. Xrays of the left hip were taken on 7/15/19 at [facility]. She is taking Naprosyn. Patient is currently doing PT for neck and lower back. She was told "nothing broken" at [place]. discharged wbat. xrays from that visit showed possible subtle acetabular fx with cross sectional imaging recommended\par \par Since her last visit the patient is now presented for any fall. We called her multiple times to discuss her MRI results she did not return any of our halls. She presents today a well as one hour late for her appointment. She is refusing x-ray today. She is complaining of continued pain in her buttock or groin pain or hip. When asked why she has not come in for a month she says insurance issues, vacation, life. She is walking without assistive device

## 2020-02-26 NOTE — PHYSICAL EXAM
[de-identified] : Left hip exam\par \par Skin: Clean/dry and intact\par Inspection: No obvious deformity, no swelling, resolving ecchymosis posteriorly\par Tenderness: No tenderness over greater trochanter/glut medius insertion. No tenderness pubic symphysis, pubic tubercle, hip flexors. No ttp ischial tuberosity or buttock. No ttp over the ASIS/[place] crest.\par ROM: 0-90° pain throughout motion. Pain with attempt at rotation\par Additional tests: Significant pain with posterior loading of the joint\par Strength: 5/5 hip flexion/ADD/ABD/Q/H/TA/GS/EHL\par Neuro: Sensation in tact to light touch throughout in dp/sp/tib/jose/saph distributions\par Pulses: 2+ DP/PT pulses [de-identified] : MRI reviewed previously.  transient hip doslocation . post wall fx.  loose bodies labral tear\par  \par \par

## 2020-02-26 NOTE — HISTORY OF PRESENT ILLNESS
[de-identified] : Patient is a 34yo female who presents with left hip pain after she fell on 7/14/19 from a fence Pain radiates into her left groin. Xrays of the left hip were taken on 7/15/19 at [facility]. She is taking Naprosyn. Patient is currently doing PT for neck and lower back. She was told "nothing broken" at [place]. discharged wbat. xrays from that visit showed possible subtle acetabular fx with cross sectional imaging recommended\par \par Since her last visit the patient is now presented for any fall. We called her multiple times to discuss her MRI results she did not return any of our halls. She presents today a well as one hour late for her appointment. She is refusing x-ray today. She is complaining of continued pain in her buttock or groin pain or hip. When asked why she has not come in for a month she says insurance issues, vacation, life. She is walking without assistive device

## 2020-02-26 NOTE — PHYSICAL EXAM
[de-identified] : Left hip exam\par \par Skin: Clean/dry and intact\par Inspection: No obvious deformity, no swelling, resolving ecchymosis posteriorly\par Tenderness: No tenderness over greater trochanter/glut medius insertion. No tenderness pubic symphysis, pubic tubercle, hip flexors. No ttp ischial tuberosity or buttock. No ttp over the ASIS/[place] crest.\par ROM: 0-90° pain throughout motion. Pain with attempt at rotation\par Additional tests: Significant pain with posterior loading of the joint\par Strength: 5/5 hip flexion/ADD/ABD/Q/H/TA/GS/EHL\par Neuro: Sensation in tact to light touch throughout in dp/sp/tib/jose/saph distributions\par Pulses: 2+ DP/PT pulses [de-identified] : MRI reviewed previously.  transient hip doslocation . post wall fx.  loose bodies labral tear\par  \par \par

## 2020-02-26 NOTE — DISCUSSION/SUMMARY
[de-identified] : 34-year-old female 8 months status post left hip dislocation. She is continuing to experience pain. I like to obtain a CT scan to evaluate the posterior wall as well as intra-articular loose bodies. She will followup after CT scan

## 2020-03-02 ENCOUNTER — EMERGENCY (EMERGENCY)
Facility: HOSPITAL | Age: 35
LOS: 0 days | Discharge: HOME | End: 2020-03-02
Attending: EMERGENCY MEDICINE
Payer: COMMERCIAL

## 2020-03-02 ENCOUNTER — TRANSCRIPTION ENCOUNTER (OUTPATIENT)
Age: 35
End: 2020-03-02

## 2020-03-02 VITALS
TEMPERATURE: 99 F | OXYGEN SATURATION: 99 % | RESPIRATION RATE: 18 BRPM | HEART RATE: 77 BPM | DIASTOLIC BLOOD PRESSURE: 60 MMHG | SYSTOLIC BLOOD PRESSURE: 109 MMHG

## 2020-03-02 VITALS — WEIGHT: 130.07 LBS

## 2020-03-02 DIAGNOSIS — R51 HEADACHE: ICD-10-CM

## 2020-03-02 DIAGNOSIS — B34.9 VIRAL INFECTION, UNSPECIFIED: ICD-10-CM

## 2020-03-02 DIAGNOSIS — R10.9 UNSPECIFIED ABDOMINAL PAIN: ICD-10-CM

## 2020-03-02 DIAGNOSIS — R50.9 FEVER, UNSPECIFIED: ICD-10-CM

## 2020-03-02 DIAGNOSIS — F17.200 NICOTINE DEPENDENCE, UNSPECIFIED, UNCOMPLICATED: ICD-10-CM

## 2020-03-02 LAB
ALBUMIN SERPL ELPH-MCNC: 4 G/DL — SIGNIFICANT CHANGE UP (ref 3.3–5)
ALP SERPL-CCNC: 44 U/L — SIGNIFICANT CHANGE UP (ref 40–120)
ALT FLD-CCNC: 13 U/L — SIGNIFICANT CHANGE UP (ref 12–78)
ANION GAP SERPL CALC-SCNC: 6 MMOL/L — SIGNIFICANT CHANGE UP (ref 5–17)
APPEARANCE UR: ABNORMAL
AST SERPL-CCNC: 13 U/L — LOW (ref 15–37)
BASOPHILS # BLD AUTO: 0.04 K/UL — SIGNIFICANT CHANGE UP (ref 0–0.2)
BASOPHILS NFR BLD AUTO: 0.5 % — SIGNIFICANT CHANGE UP (ref 0–2)
BILIRUB SERPL-MCNC: 0.5 MG/DL — SIGNIFICANT CHANGE UP (ref 0.2–1.2)
BILIRUB UR-MCNC: NEGATIVE — SIGNIFICANT CHANGE UP
BUN SERPL-MCNC: 7 MG/DL — SIGNIFICANT CHANGE UP (ref 7–23)
CALCIUM SERPL-MCNC: 8.5 MG/DL — SIGNIFICANT CHANGE UP (ref 8.5–10.1)
CHLORIDE SERPL-SCNC: 110 MMOL/L — HIGH (ref 96–108)
CO2 SERPL-SCNC: 25 MMOL/L — SIGNIFICANT CHANGE UP (ref 22–31)
COLOR SPEC: YELLOW — SIGNIFICANT CHANGE UP
CREAT SERPL-MCNC: 0.71 MG/DL — SIGNIFICANT CHANGE UP (ref 0.5–1.3)
DIFF PNL FLD: NEGATIVE — SIGNIFICANT CHANGE UP
EOSINOPHIL # BLD AUTO: 0.1 K/UL — SIGNIFICANT CHANGE UP (ref 0–0.5)
EOSINOPHIL NFR BLD AUTO: 1.2 % — SIGNIFICANT CHANGE UP (ref 0–6)
GLUCOSE SERPL-MCNC: 87 MG/DL — SIGNIFICANT CHANGE UP (ref 70–99)
GLUCOSE UR QL: NEGATIVE MG/DL — SIGNIFICANT CHANGE UP
HCT VFR BLD CALC: 28.4 % — LOW (ref 34.5–45)
HGB BLD-MCNC: 10.1 G/DL — LOW (ref 11.5–15.5)
IMM GRANULOCYTES NFR BLD AUTO: 0.2 % — SIGNIFICANT CHANGE UP (ref 0–1.5)
KETONES UR-MCNC: NEGATIVE — SIGNIFICANT CHANGE UP
LEUKOCYTE ESTERASE UR-ACNC: NEGATIVE — SIGNIFICANT CHANGE UP
LIDOCAIN IGE QN: 182 U/L — SIGNIFICANT CHANGE UP (ref 73–393)
LYMPHOCYTES # BLD AUTO: 1.98 K/UL — SIGNIFICANT CHANGE UP (ref 1–3.3)
LYMPHOCYTES # BLD AUTO: 24.3 % — SIGNIFICANT CHANGE UP (ref 13–44)
MCHC RBC-ENTMCNC: 22.5 PG — LOW (ref 27–34)
MCHC RBC-ENTMCNC: 35.6 GM/DL — SIGNIFICANT CHANGE UP (ref 32–36)
MCV RBC AUTO: 63.4 FL — LOW (ref 80–100)
MONOCYTES # BLD AUTO: 0.6 K/UL — SIGNIFICANT CHANGE UP (ref 0–0.9)
MONOCYTES NFR BLD AUTO: 7.4 % — SIGNIFICANT CHANGE UP (ref 2–14)
NEUTROPHILS # BLD AUTO: 5.4 K/UL — SIGNIFICANT CHANGE UP (ref 1.8–7.4)
NEUTROPHILS NFR BLD AUTO: 66.4 % — SIGNIFICANT CHANGE UP (ref 43–77)
NITRITE UR-MCNC: NEGATIVE — SIGNIFICANT CHANGE UP
PH UR: 6 — SIGNIFICANT CHANGE UP (ref 5–8)
PLATELET # BLD AUTO: 201 K/UL — SIGNIFICANT CHANGE UP (ref 150–400)
POTASSIUM SERPL-MCNC: 3.7 MMOL/L — SIGNIFICANT CHANGE UP (ref 3.5–5.3)
POTASSIUM SERPL-SCNC: 3.7 MMOL/L — SIGNIFICANT CHANGE UP (ref 3.5–5.3)
PROT SERPL-MCNC: 6.9 GM/DL — SIGNIFICANT CHANGE UP (ref 6–8.3)
PROT UR-MCNC: NEGATIVE MG/DL — SIGNIFICANT CHANGE UP
RBC # BLD: 4.48 M/UL — SIGNIFICANT CHANGE UP (ref 3.8–5.2)
RBC # FLD: 17.2 % — HIGH (ref 10.3–14.5)
SODIUM SERPL-SCNC: 141 MMOL/L — SIGNIFICANT CHANGE UP (ref 135–145)
SP GR SPEC: 1 — LOW (ref 1.01–1.02)
UROBILINOGEN FLD QL: NEGATIVE MG/DL — SIGNIFICANT CHANGE UP
WBC # BLD: 8.14 K/UL — SIGNIFICANT CHANGE UP (ref 3.8–10.5)
WBC # FLD AUTO: 8.14 K/UL — SIGNIFICANT CHANGE UP (ref 3.8–10.5)

## 2020-03-02 PROCEDURE — 83690 ASSAY OF LIPASE: CPT

## 2020-03-02 PROCEDURE — 85025 COMPLETE CBC W/AUTO DIFF WBC: CPT

## 2020-03-02 PROCEDURE — 99283 EMERGENCY DEPT VISIT LOW MDM: CPT | Mod: 25

## 2020-03-02 PROCEDURE — 80053 COMPREHEN METABOLIC PANEL: CPT

## 2020-03-02 PROCEDURE — 36415 COLL VENOUS BLD VENIPUNCTURE: CPT

## 2020-03-02 PROCEDURE — 99283 EMERGENCY DEPT VISIT LOW MDM: CPT

## 2020-03-02 PROCEDURE — 81001 URINALYSIS AUTO W/SCOPE: CPT

## 2020-03-02 PROCEDURE — 81025 URINE PREGNANCY TEST: CPT

## 2020-03-02 RX ORDER — SODIUM CHLORIDE 9 MG/ML
1000 INJECTION INTRAMUSCULAR; INTRAVENOUS; SUBCUTANEOUS ONCE
Refills: 0 | Status: COMPLETED | OUTPATIENT
Start: 2020-03-02 | End: 2020-03-02

## 2020-03-02 RX ORDER — IBUPROFEN 200 MG
1 TABLET ORAL
Qty: 10 | Refills: 0
Start: 2020-03-02 | End: 2020-03-06

## 2020-03-02 RX ADMIN — SODIUM CHLORIDE 1000 MILLILITER(S): 9 INJECTION INTRAMUSCULAR; INTRAVENOUS; SUBCUTANEOUS at 15:59

## 2020-03-02 NOTE — ED STATDOCS - PROGRESS NOTE DETAILS
35 y/o female with no significant PMHx presents to the ED c/o flu like symptoms. +HA, +fever, +chills, +abd pain. No known sick contacts. Took Excedrin and Tylenol for HA with improvement. Pt reports she has not felt well since she returned from the [place] in early January. NKDA. Smoker. Occasional EtOH use. No other complaints at this time.hayden: ivfs, labs, and reeval. -Mehreen Barry PA-C pt reeval and states she feels so much better, pt aware to fu with pmd and to return to ed for any worsening of symptoms, pt well appearing on dc. -Mehreen Barry PA-C

## 2020-03-02 NOTE — ED ADULT NURSE NOTE - OBJECTIVE STATEMENT
c/o flu like symptoms. +HA, +fever, +chills, +abd pain. No known sick contacts. Took Excedrin and Tylenol for HA with improvement. Pt reports she has not felt well since she returned from the [place] in early January.

## 2020-03-02 NOTE — ED STATDOCS - OBJECTIVE STATEMENT
33 y/o female with no significant PMHx presents to the ED c/o flu like symptoms. +HA, +fever, +chills, +abd pain. No known sick contacts. Took Excedrin and Tylenol for HA with improvement. Pt reports she has not felt well since she returned from the [place] in early January. NKDA. Smoker. Occasional EtOH use. No other complaints at this time.

## 2020-03-02 NOTE — ED STATDOCS - PATIENT PORTAL LINK FT
You can access the FollowMyHealth Patient Portal offered by [facility] by registering at the following website: [email or web address]. By joining [facility]’s FollowMyHealth portal, you will also be able to view your health information using other applications (apps) compatible with our system.

## 2020-03-02 NOTE — ED STATDOCS - ENMT, MLM
Nasal mucosa clear.  Mouth with normal mucosa  Throat has no vesicles, no oropharyngeal exudates and uvula is midline. +post nasal drip. +Air fluid levels b/l TM with opacity.

## 2020-05-22 ENCOUNTER — EMERGENCY (EMERGENCY)
Facility: HOSPITAL | Age: 35
LOS: 1 days | Discharge: HOME | End: 2020-05-22
Attending: EMERGENCY MEDICINE
Payer: COMMERCIAL

## 2020-05-22 ENCOUNTER — EMERGENCY (EMERGENCY)
Facility: HOSPITAL | Age: 35
LOS: 0 days | Discharge: OTHER INSTITUTION | End: 2020-05-22
Attending: EMERGENCY MEDICINE
Payer: COMMERCIAL

## 2020-05-22 VITALS — HEART RATE: 58 BPM

## 2020-05-22 VITALS
HEART RATE: 55 BPM | DIASTOLIC BLOOD PRESSURE: 69 MMHG | RESPIRATION RATE: 16 BRPM | OXYGEN SATURATION: 100 % | SYSTOLIC BLOOD PRESSURE: 118 MMHG | TEMPERATURE: 98 F

## 2020-05-22 VITALS
DIASTOLIC BLOOD PRESSURE: 76 MMHG | RESPIRATION RATE: 16 BRPM | TEMPERATURE: 98 F | SYSTOLIC BLOOD PRESSURE: 130 MMHG | HEART RATE: 40 BPM | HEIGHT: 63 IN | OXYGEN SATURATION: 99 %

## 2020-05-22 VITALS — HEIGHT: 63 IN | WEIGHT: 139.99 LBS

## 2020-05-22 DIAGNOSIS — S02.32XA FRACTURE OF ORBITAL FLOOR, LEFT SIDE, INITIAL ENCOUNTER FOR CLOSED FRACTURE: ICD-10-CM
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
## 2020-05-22 DIAGNOSIS — H57.12 OCULAR PAIN, LEFT EYE: ICD-10-CM

## 2020-05-22 DIAGNOSIS — R51 HEADACHE: ICD-10-CM
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
## 2020-05-22 LAB
ALBUMIN SERPL ELPH-MCNC: 3.9 G/DL — SIGNIFICANT CHANGE UP (ref 3.3–5)
ALP SERPL-CCNC: 52 U/L — SIGNIFICANT CHANGE UP (ref 40–120)
ALT FLD-CCNC: 28 U/L — SIGNIFICANT CHANGE UP (ref 12–78)
ANION GAP SERPL CALC-SCNC: 8 MMOL/L — SIGNIFICANT CHANGE UP (ref 5–17)
APTT BLD: 27.5 SEC — SIGNIFICANT CHANGE UP (ref 27.5–36.3)
AST SERPL-CCNC: 26 U/L — SIGNIFICANT CHANGE UP (ref 15–37)
BASOPHILS # BLD AUTO: 0.05 K/UL — SIGNIFICANT CHANGE UP (ref 0–0.2)
BASOPHILS NFR BLD AUTO: 0.7 % — SIGNIFICANT CHANGE UP (ref 0–2)
BILIRUB SERPL-MCNC: 1.1 MG/DL — SIGNIFICANT CHANGE UP (ref 0.2–1.2)
BUN SERPL-MCNC: 9 MG/DL — SIGNIFICANT CHANGE UP (ref 7–23)
CALCIUM SERPL-MCNC: 9 MG/DL — SIGNIFICANT CHANGE UP (ref 8.5–10.1)
CHLORIDE SERPL-SCNC: 108 MMOL/L — SIGNIFICANT CHANGE UP (ref 96–108)
CO2 SERPL-SCNC: 26 MMOL/L — SIGNIFICANT CHANGE UP (ref 22–31)
CREAT SERPL-MCNC: 0.67 MG/DL — SIGNIFICANT CHANGE UP (ref 0.5–1.3)
EOSINOPHIL # BLD AUTO: 0.26 K/UL — SIGNIFICANT CHANGE UP (ref 0–0.5)
EOSINOPHIL NFR BLD AUTO: 3.5 % — SIGNIFICANT CHANGE UP (ref 0–6)
GLUCOSE SERPL-MCNC: 90 MG/DL — SIGNIFICANT CHANGE UP (ref 70–99)
HCG SERPL-ACNC: <1 MIU/ML — SIGNIFICANT CHANGE UP
HCT VFR BLD CALC: 28.7 % — LOW (ref 34.5–45)
HGB BLD-MCNC: 10.4 G/DL — LOW (ref 11.5–15.5)
IMM GRANULOCYTES NFR BLD AUTO: 0.4 % — SIGNIFICANT CHANGE UP (ref 0–1.5)
INR BLD: 1.01 RATIO — SIGNIFICANT CHANGE UP (ref 0.88–1.16)
LYMPHOCYTES # BLD AUTO: 1.68 K/UL — SIGNIFICANT CHANGE UP (ref 1–3.3)
LYMPHOCYTES # BLD AUTO: 22.5 % — SIGNIFICANT CHANGE UP (ref 13–44)
MCHC RBC-ENTMCNC: 22.9 PG — LOW (ref 27–34)
MCHC RBC-ENTMCNC: 36.2 GM/DL — HIGH (ref 32–36)
MCV RBC AUTO: 63.1 FL — LOW (ref 80–100)
MONOCYTES # BLD AUTO: 0.69 K/UL — SIGNIFICANT CHANGE UP (ref 0–0.9)
MONOCYTES NFR BLD AUTO: 9.2 % — SIGNIFICANT CHANGE UP (ref 2–14)
NEUTROPHILS # BLD AUTO: 4.77 K/UL — SIGNIFICANT CHANGE UP (ref 1.8–7.4)
NEUTROPHILS NFR BLD AUTO: 63.7 % — SIGNIFICANT CHANGE UP (ref 43–77)
PLATELET # BLD AUTO: 216 K/UL — SIGNIFICANT CHANGE UP (ref 150–400)
POTASSIUM SERPL-MCNC: 3.6 MMOL/L — SIGNIFICANT CHANGE UP (ref 3.5–5.3)
POTASSIUM SERPL-SCNC: 3.6 MMOL/L — SIGNIFICANT CHANGE UP (ref 3.5–5.3)
PROT SERPL-MCNC: 7.1 GM/DL — SIGNIFICANT CHANGE UP (ref 6–8.3)
PROTHROM AB SERPL-ACNC: 11.2 SEC — SIGNIFICANT CHANGE UP (ref 10–12.9)
RBC # BLD: 4.55 M/UL — SIGNIFICANT CHANGE UP (ref 3.8–5.2)
RBC # FLD: 15 % — HIGH (ref 10.3–14.5)
SODIUM SERPL-SCNC: 142 MMOL/L — SIGNIFICANT CHANGE UP (ref 135–145)
WBC # BLD: 7.48 K/UL — SIGNIFICANT CHANGE UP (ref 3.8–10.5)
WBC # FLD AUTO: 7.48 K/UL — SIGNIFICANT CHANGE UP (ref 3.8–10.5)

## 2020-05-22 PROCEDURE — 70450 CT HEAD/BRAIN W/O DYE: CPT

## 2020-05-22 PROCEDURE — 70486 CT MAXILLOFACIAL W/O DYE: CPT | Mod: 26

## 2020-05-22 PROCEDURE — 85025 COMPLETE CBC W/AUTO DIFF WBC: CPT

## 2020-05-22 PROCEDURE — 70486 CT MAXILLOFACIAL W/O DYE: CPT

## 2020-05-22 PROCEDURE — 96374 THER/PROPH/DIAG INJ IV PUSH: CPT

## 2020-05-22 PROCEDURE — 72125 CT NECK SPINE W/O DYE: CPT | Mod: 26

## 2020-05-22 PROCEDURE — 85610 PROTHROMBIN TIME: CPT

## 2020-05-22 PROCEDURE — 86900 BLOOD TYPING SEROLOGIC ABO: CPT

## 2020-05-22 PROCEDURE — 85730 THROMBOPLASTIN TIME PARTIAL: CPT

## 2020-05-22 PROCEDURE — 99284 EMERGENCY DEPT VISIT MOD MDM: CPT

## 2020-05-22 PROCEDURE — 72125 CT NECK SPINE W/O DYE: CPT

## 2020-05-22 PROCEDURE — 86901 BLOOD TYPING SEROLOGIC RH(D): CPT

## 2020-05-22 PROCEDURE — 99285 EMERGENCY DEPT VISIT HI MDM: CPT

## 2020-05-22 PROCEDURE — 84702 CHORIONIC GONADOTROPIN TEST: CPT

## 2020-05-22 PROCEDURE — 99285 EMERGENCY DEPT VISIT HI MDM: CPT | Mod: 25

## 2020-05-22 PROCEDURE — 96375 TX/PRO/DX INJ NEW DRUG ADDON: CPT

## 2020-05-22 PROCEDURE — 76376 3D RENDER W/INTRP POSTPROCES: CPT | Mod: 26

## 2020-05-22 PROCEDURE — 36415 COLL VENOUS BLD VENIPUNCTURE: CPT

## 2020-05-22 PROCEDURE — 99284 EMERGENCY DEPT VISIT MOD MDM: CPT | Mod: 25

## 2020-05-22 PROCEDURE — 70450 CT HEAD/BRAIN W/O DYE: CPT | Mod: 26

## 2020-05-22 PROCEDURE — 86850 RBC ANTIBODY SCREEN: CPT

## 2020-05-22 PROCEDURE — 76376 3D RENDER W/INTRP POSTPROCES: CPT

## 2020-05-22 PROCEDURE — 80053 COMPREHEN METABOLIC PANEL: CPT

## 2020-05-22 RX ORDER — ONDANSETRON 8 MG/1
4 TABLET, FILM COATED ORAL ONCE
Refills: 0 | Status: COMPLETED | OUTPATIENT
Start: 2020-05-22 | End: 2020-05-22

## 2020-05-22 RX ORDER — ACETAMINOPHEN 500 MG
1000 TABLET ORAL ONCE
Refills: 0 | Status: DISCONTINUED | OUTPATIENT
Start: 2020-05-22 | End: 2020-05-22

## 2020-05-22 RX ORDER — SODIUM CHLORIDE 9 MG/ML
1000 INJECTION INTRAMUSCULAR; INTRAVENOUS; SUBCUTANEOUS ONCE
Refills: 0 | Status: COMPLETED | OUTPATIENT
Start: 2020-05-22 | End: 2020-05-22

## 2020-05-22 RX ORDER — MORPHINE SULFATE 50 MG/1
4 CAPSULE, EXTENDED RELEASE ORAL ONCE
Refills: 0 | Status: COMPLETED | OUTPATIENT
Start: 2020-05-22 | End: 2020-05-22

## 2020-05-22 RX ORDER — METOCLOPRAMIDE HCL 10 MG
10 TABLET ORAL ONCE
Refills: 0 | Status: DISCONTINUED | OUTPATIENT
Start: 2020-05-22 | End: 2020-05-22

## 2020-05-22 RX ORDER — MORPHINE SULFATE 50 MG/1
4 CAPSULE, EXTENDED RELEASE ORAL ONCE
Refills: 0 | Status: DISCONTINUED | OUTPATIENT
Start: 2020-05-22 | End: 2020-05-22

## 2020-05-22 RX ORDER — MORPHINE SULFATE 50 MG/1
2 CAPSULE, EXTENDED RELEASE ORAL ONCE
Refills: 0 | Status: DISCONTINUED | OUTPATIENT
Start: 2020-05-22 | End: 2020-05-22

## 2020-05-22 RX ORDER — OXYCODONE HYDROCHLORIDE 5 MG/1
1 TABLET ORAL
Qty: 8 | Refills: 0
Start: 2020-05-22

## 2020-05-22 RX ADMIN — MORPHINE SULFATE 2 MILLIGRAM(S): 50 CAPSULE, EXTENDED RELEASE ORAL at 19:05

## 2020-05-22 RX ADMIN — SODIUM CHLORIDE 1000 MILLILITER(S): 9 INJECTION INTRAMUSCULAR; INTRAVENOUS; SUBCUTANEOUS at 15:11

## 2020-05-22 RX ADMIN — MORPHINE SULFATE 2 MILLIGRAM(S): 50 CAPSULE, EXTENDED RELEASE ORAL at 21:39

## 2020-05-22 RX ADMIN — ONDANSETRON 4 MILLIGRAM(S): 8 TABLET, FILM COATED ORAL at 15:11

## 2020-05-22 RX ADMIN — MORPHINE SULFATE 4 MILLIGRAM(S): 50 CAPSULE, EXTENDED RELEASE ORAL at 15:10

## 2020-05-22 NOTE — ED ADULT NURSE NOTE - CHPI ED NUR SYMPTOMS NEG
no fever/no tingling/no weakness/no abrasion/no confusion/no deformity/no bleeding/no loss of consciousness/no numbness/no vomiting

## 2020-05-22 NOTE — ED PROVIDER NOTE - ATTENDING CONTRIBUTION TO CARE
Attending MD Ch: I personally have seen and examined this patient.  Resident note reviewed and agree on plan of care and except where noted.  See below for details.     seen in [place] [facility]    34F with PMH/PSH including C sec x 3 presents to the ED transferred from [place] for L orbital floor fracture s/p trauma.  Reports that she slipped and fell getting out of shower.  Reports that struck face on L side.  Review of EMR reveals left orbital floor fracture noted inferiorly medially and posteriorly with a large amount of herniated orbital fat into the sinus. Also there is tenting of the inferior rectus in conjunction with left-sided orbital emphysema.  Patient was sent to ED for Ophtho.  Reports no change in vision when looking straight but reports diplopia on attempting to looking medially, laterally or superiorly.  Reports pain with those same motions.  Denies headache, neck pain, difficulty opening and closing jaw, loose teeth.  Denies chest pain, shortness of breath, palpitations. Denies abdominal pain, nausea, vomiting, diarrhea. Denies loss of urinary or bowel continence. Denies numbness, weakness or tingling in extremities. On exam, NAD, no tenderness to midline spine, PERRL, EOM OD, EOM OS decreased looking up but otherwise intact, confrontational VF full, Va sc OD 20/20, OS 20/20, OU 20/20, no periorbital ecchymosis OD, trace ecchymosis at SANDRA and LLL, no tenderness to palpation of orbital rim OD, +tenderness to inferior orbital rim on L, lid margins clear, no conjunctival injection, remainder of ocular exam deferred; A/P: 34F with L orbital floor fracture, pending ophtho

## 2020-05-22 NOTE — CONSULT NOTE ADULT - SUBJECTIVE AND OBJECTIVE BOX
[facility] DEPARTMENT OF OPHTHALMOLOGY - INITIAL ADULT CONSULT  -----------------------------------------------------------------------------  Aashish Chairez MD PGY-2  Pager: [phone]/[facility]: [phone]  -----------------------------------------------------------------------------    HPI: 34F no pmh/poh transfer from [place] after fall this AM, found to have L orbital floor fracture. Pt currently reports binocular double vision w/ upgaze, left gaze, and downgaze. Pain w/ upgaze, no nausea.     PMH: none  POcHx: denies surg/laser  FH: denies glc/amd  Social History: denies etoh/tobacco  Ophthalmic Medications: none  Allergies: NKDA    Review of Systems:  Constitutional: No fever, chills  Eyes: No blurry vision, flashes, floaters, FBS, erythema, discharge, OU +binocular diplopia w/ upgaze.   Neuro: No tremors  Cardiovascular: No chest pain, palpitations  Respiratory: No SOB, no cough  GI: No nausea, vomiting, abdominal pain  : No dysuria  Skin: no rash  Psych: no depression  Endocrine: no polyuria, polydipsia  Heme/lymph: no swelling    VITALS: T(C): 36.7 (05-22-20 @ 18:32)  T(F): 98 (05-22-20 @ 18:32), Max: 99 (05-22-20 @ 15:32)  HR: 40 (05-22-20 @ 18:32) (40 - 77)  BP: 130/76 (05-22-20 @ 18:32) (118/69 - 130/76)  RR:  (16 - 19)  SpO2:  (99% - 100%)  Wt(kg): --  General: AAO x 3, appropriate mood and affect    Ophthalmology Exam:  Visual acuity (sc): 20/20 OU  Pupils: PERRL OU, no APD  Ttono: 11 OU, w/ upgaze OD 15, OS 19.   Extraocular movements (EOMs): Full OD, OS w/ 90% supraduction, otherwise full. Diplopia w/ upgaze, downgaze, and left gaze.  Confrontational Visual Field (CVF): Full OU  Color Plates: 12/12 OU    Pen Light Exam (PLE)  External: Flat OU  Lids/Lashes/Lacrimal Ducts: Flat OD, tr edema SANDRA and tr erythema LLL    Sclera/Conjunctiva: W+Q OU  Cornea: 1+ SPK OU  Anterior Chamber: D+F OU    Iris: Flat OU  Lens: Cl OU    Fundus Exam: dilated with 1% tropicamide and 2.5% phenylephrine  Approval obtained from primary team for dilation  Patient aware that pupils can remained dilated for at least 4-6 hours  Exam performed with 20D lens    Vitreous: wnl OU  Disc, cup/disc: sharp and pink, 0.4 OU  Macula: wnl OU  Vessels: wnl OU  Periphery: wnl OU    Labs/Imaging:  < from: CT Maxillofacial No Cont (05.22.20 @ 14:03) >  FINDINGS:    ORBITS:  There is a fracture of the left orbital floor. The fracture is in the mesial floor with downward herniation of fat and with the tenting of the inferior rectus into the defect. There is air within the orbit both extraconal and intraconal. The herniated fat measures 1.5 cm within the sinuses, sphenoid fairly narrow defect. No globe rupture is suggested. Right orbit is unremarkable. On sagittal images, the floor defect measures 1.6 cm posteriorly.    FACIAL BONES:   The other facial bones appear to be intact. No mandibular fracture is noted.      NASAL BONES:  no fracture is identified.    PARANASAL SINUSES:  There is fluid in the left maxillary sinus. Mucosal disease is noted in the ethmoid and frontal sinuses. Again there is herniated orbital fat in the upper left maxillary sinus.    Mastoid cells partial opacification is noted on the right.    IMPRESSION:      1. Left orbital floor fracture noted inferiorly medially and posteriorly with a large amount of herniated orbital fat into the sinus. Also there is tenting of the inferior rectus in conjunction with left-sided orbital emphysema.. No globe rupture suggested. See above. The other facial bones appear to be intact.      Assessment and Recommendations:  34y female w/ no pmhx/ochx, here after fall this AM, found to have left orbital floor fracture, transferred from [place] for ophthalmologic evaluation. On exam, BCVA 20/20 OU, no APD, IOP wnl at primary gaze w/ upgaze IOP OD 15, OS 19. EOM OD full, OS w/ 90% supraduction and diplopia on up, down, and left gaze. Slit lamp exam notable for tr edema of SANDRA and tr erythema of LLL. DFE    Outpatient follow-up: Patient should follow-up with his/her ophthalmologist or with [facility] Department of Ophthalmology within 1 week of after discharge at:    [address]  [place] [address]  [phone]    Asahish Chairez MD, PGY-2  Pager: [phone]/[facility]: [phone] [facility] DEPARTMENT OF OPHTHALMOLOGY - INITIAL ADULT CONSULT  -----------------------------------------------------------------------------  Aashish Chairez MD PGY-2  Pager: [phone]/[facility]: [phone]  -----------------------------------------------------------------------------    HPI: 34F no pmh/poh transfer from [place] after fall this AM, found to have L orbital floor fracture. Pt currently reports binocular double vision w/ upgaze, left gaze, and downgaze. Pain w/ upgaze, no nausea.     PMH: none  POcHx: denies surg/laser  FH: denies glc/amd  Social History: denies etoh/tobacco  Ophthalmic Medications: none  Allergies: NKDA    Review of Systems:  Constitutional: No fever, chills  Eyes: No blurry vision, flashes, floaters, FBS, erythema, discharge, OU +binocular diplopia w/ upgaze.   Neuro: No tremors  Cardiovascular: No chest pain, palpitations  Respiratory: No SOB, no cough  GI: No nausea, vomiting, abdominal pain  : No dysuria  Skin: no rash  Psych: no depression  Endocrine: no polyuria, polydipsia  Heme/lymph: no swelling    VITALS: T(C): 36.7 (05-22-20 @ 18:32)  T(F): 98 (05-22-20 @ 18:32), Max: 99 (05-22-20 @ 15:32)  HR: 40 (05-22-20 @ 18:32) (40 - 77)  BP: 130/76 (05-22-20 @ 18:32) (118/69 - 130/76)  RR:  (16 - 19)  SpO2:  (99% - 100%)  Wt(kg): --  General: AAO x 3, appropriate mood and affect    Ophthalmology Exam:  Visual acuity (sc): 20/20 OU  Pupils: PERRL OU, no APD  Ttono: 11 OU, w/ upgaze OD 15, OS 19.   Extraocular movements (EOMs): Full OD, OS w/ 90% supraduction, otherwise full. Diplopia w/ upgaze, downgaze, and left gaze.  Confrontational Visual Field (CVF): Full OU  Color Plates: 12/12 OU    Pen Light Exam (PLE)  External: Flat OU  Lids/Lashes/Lacrimal Ducts: Flat OD, tr edema SANDRA and tr erythema LLL    Sclera/Conjunctiva: W+Q OU  Cornea: 1+ SPK OU  Anterior Chamber: D+F OU    Iris: Flat OU  Lens: Cl OU    Fundus Exam: dilated with 1% tropicamide and 2.5% phenylephrine  Approval obtained from primary team for dilation  Patient aware that pupils can remained dilated for at least 4-6 hours  Exam performed with 20D lens    Vitreous: wnl OU  Disc, cup/disc: sharp and pink, 0.3 OU  Macula: wnl OU  Vessels: wnl OU  Periphery: wnl OD, commotio from 5-6 o'clock     Labs/Imaging:  < from: CT Maxillofacial No Cont (05.22.20 @ 14:03) >  FINDINGS:    ORBITS:  There is a fracture of the left orbital floor. The fracture is in the mesial floor with downward herniation of fat and with the tenting of the inferior rectus into the defect. There is air within the orbit both extraconal and intraconal. The herniated fat measures 1.5 cm within the sinuses, sphenoid fairly narrow defect. No globe rupture is suggested. Right orbit is unremarkable. On sagittal images, the floor defect measures 1.6 cm posteriorly.    FACIAL BONES:   The other facial bones appear to be intact. No mandibular fracture is noted.      NASAL BONES:  no fracture is identified.    PARANASAL SINUSES:  There is fluid in the left maxillary sinus. Mucosal disease is noted in the ethmoid and frontal sinuses. Again there is herniated orbital fat in the upper left maxillary sinus.    Mastoid cells partial opacification is noted on the right.    IMPRESSION:      1. Left orbital floor fracture noted inferiorly medially and posteriorly with a large amount of herniated orbital fat into the sinus. Also there is tenting of the inferior rectus in conjunction with left-sided orbital emphysema.. No globe rupture suggested. See above. The other facial bones appear to be intact.      Assessment and Recommendations:  34y female w/ no pmhx/ochx, here after fall this AM, found to have left orbital floor fracture, transferred from [place] for ophthalmologic evaluation. On exam, BCVA 20/20 OU, no APD, IOP wnl at primary gaze w/ upgaze IOP OD 15, OS 19. EOM OD full, OS w/ 90% supraduction and diplopia on up, down, and left gaze. Slit lamp exam notable for tr edema of SANDRA and tr erythema of LLL. DFE w/ sharp and pink nerves OU and commotio at 5-6 o'clock OS.  - repeat DFE in 1 week    Outpatient follow-up: Patient should follow-up with his/her ophthalmologist or with [facility] Department of Ophthalmology within 1 week of after discharge at:    [address]  [place] [address]  [phone]    Aashish Chairez MD, PGY-2  Pager: [phone]/[facility]: [phone] [facility] DEPARTMENT OF OPHTHALMOLOGY - INITIAL ADULT CONSULT  -----------------------------------------------------------------------------  Aashish Chairez MD PGY-2  Pager: [phone]/[facility]: [phone]  -----------------------------------------------------------------------------    HPI: 34F no pmh/poh transfer from [place] after fall this AM, found to have L orbital floor fracture. Pt currently reports binocular double vision w/ upgaze, left gaze, and downgaze. Pain w/ upgaze, no nausea.     PMH: none  POcHx: denies surg/laser  FH: denies glc/amd  Social History: denies etoh/tobacco  Ophthalmic Medications: none  Allergies: NKDA    Review of Systems:  Constitutional: No fever, chills  Eyes: No blurry vision, flashes, floaters, FBS, erythema, discharge, OU +binocular diplopia w/ upgaze.   Neuro: No tremors  Cardiovascular: No chest pain, palpitations  Respiratory: No SOB, no cough  GI: No nausea, vomiting, abdominal pain  : No dysuria  Skin: no rash  Psych: no depression  Endocrine: no polyuria, polydipsia  Heme/lymph: no swelling    VITALS: T(C): 36.7 (05-22-20 @ 18:32)  T(F): 98 (05-22-20 @ 18:32), Max: 99 (05-22-20 @ 15:32)  HR: 40 (05-22-20 @ 18:32) (40 - 77)  BP: 130/76 (05-22-20 @ 18:32) (118/69 - 130/76)  RR:  (16 - 19)  SpO2:  (99% - 100%)  Wt(kg): --  General: AAO x 3, appropriate mood and affect    Ophthalmology Exam:  Visual acuity (sc): 20/20 OU  Pupils: PERRL OU, no APD  Ttono: 11 OU, w/ upgaze OD 15, OS 19.   Extraocular movements (EOMs): Full OD, OS w/ 90% supraduction, otherwise full. Diplopia w/ upgaze, downgaze, and left gaze.  Confrontational Visual Field (CVF): Full OU  Color Plates: 12/12 OU    Pen Light Exam (PLE)  External: Flat OU  Lids/Lashes/Lacrimal Ducts: Flat OD, tr edema SANDRA and tr erythema LLL    Sclera/Conjunctiva: W+Q OU  Cornea: 1+ SPK OU  Anterior Chamber: D+F OU    Iris: Flat OU  Lens: Cl OU    Fundus Exam: dilated with 1% tropicamide and 2.5% phenylephrine  Approval obtained from primary team for dilation  Patient aware that pupils can remained dilated for at least 4-6 hours  Exam performed with 20D lens    Vitreous: wnl OU  Disc, cup/disc: sharp and pink, 0.3 OU  Macula: wnl OU  Vessels: wnl OU  Periphery: wnl OD, commotio from 5-6 o'clock     Labs/Imaging:  < from: CT Maxillofacial No Cont (05.22.20 @ 14:03) >  FINDINGS:    ORBITS:  There is a fracture of the left orbital floor. The fracture is in the mesial floor with downward herniation of fat and with the tenting of the inferior rectus into the defect. There is air within the orbit both extraconal and intraconal. The herniated fat measures 1.5 cm within the sinuses, sphenoid fairly narrow defect. No globe rupture is suggested. Right orbit is unremarkable. On sagittal images, the floor defect measures 1.6 cm posteriorly.    FACIAL BONES:   The other facial bones appear to be intact. No mandibular fracture is noted.      NASAL BONES:  no fracture is identified.    PARANASAL SINUSES:  There is fluid in the left maxillary sinus. Mucosal disease is noted in the ethmoid and frontal sinuses. Again there is herniated orbital fat in the upper left maxillary sinus.    Mastoid cells partial opacification is noted on the right.    IMPRESSION:      1. Left orbital floor fracture noted inferiorly medially and posteriorly with a large amount of herniated orbital fat into the sinus. Also there is tenting of the inferior rectus in conjunction with left-sided orbital emphysema.. No globe rupture suggested. See above. The other facial bones appear to be intact.      Assessment and Recommendations:  34y female w/ no pmhx/ochx, here after fall this AM, found to have left orbital floor fracture, transferred from [place] for ophthalmologic evaluation. On exam, BCVA 20/20 OU, no APD, IOP wnl at primary gaze w/ upgaze IOP OD 15, OS 19. EOM OD full, OS w/ 90% supraduction and diplopia on up, down, and left gaze. Slit lamp exam notable for tr edema of SANDRA and tr erythema of LLL. DFE w/ sharp and pink nerves OU and commotio at 5-6 o'clock OS.  - medrol dose pack  - no nose blowing, HOB elevation, no heavy lifting/straining, sneeze w/ mouth open  - ice pack PRN OS  - repeat DFE in 1 week  - follow-up in clinic Tuesday at address below at 9am  - D/W Dr. Vásquez (oculoplastics)    Outpatient follow-up: Patient should follow-up with his/her ophthalmologist or with [facility] Department of Ophthalmology:    [address]  [place] [address]  [phone]    Aashish Chairez MD, PGY-2  Pager: [phone]/[facility]: [phone]

## 2020-05-22 NOTE — ED ADULT NURSE NOTE - OBJECTIVE STATEMENT
Patient presents post head trauma after fall this morning @4:30 in the shower. Patient states she took Excedrin and went back to bed but awoke with 10/10 "worst pain in life" head pain. Patient alert and oriented at this time x3. Patient able to move all extremities, denies numbness or tingling. Extraocular movements intact but has photophobia.   neuro alert.

## 2020-05-22 NOTE — ED ADULT TRIAGE NOTE - CHIEF COMPLAINT QUOTE
Patient comes in with headache after falling while getting out of the shower today. +headstrike/ no loc. patient endorses triple vision and photophobia. patient endorses hx of migraine but states this headache "feels worse than normal". Patient made neuro alert as per Dr. Andujar.

## 2020-05-22 NOTE — ED PROVIDER NOTE - PATIENT PORTAL LINK FT
You can access the FollowMyHealth Patient Portal offered by [facility] by registering at the following website: [email or web address]. By joining [email or web address]’s FollowMyHealth portal, you will also be able to view your health information using other applications (apps) compatible with our system.

## 2020-05-22 NOTE — ED PROVIDER NOTE - CARE PLAN
Principal Discharge DX:	Orbital floor fracture  Secondary Diagnosis:	Diplopia  Secondary Diagnosis:	Fall

## 2020-05-22 NOTE — ED ADULT NURSE NOTE - PAIN: PRESENCE, MLM
complains of pain/discomfort
I have personally performed a face to face diagnostic evaluation on this patient. I have reviewed the ACP note and agree with the history, exam and plan of care, except as noted.

## 2020-05-22 NOTE — ED PROVIDER NOTE - LIVES WITH, PROFILE
OR cancelled for today given INR 1.7. Will reverse INR with Vitamin K and start heparin infusion in interim given acute PE. Plan to stop heparin infusion 6 hours prior to OR tomorrow. Recheck INR in AM and can start lovenox post-OR. Can then bridge warfarin with lovenox.    Laurie Melgoza D.O.  Internal Medicine Hospitalist      at home

## 2020-05-22 NOTE — ED PROVIDER NOTE - PHYSICAL EXAMINATION
General: alert, conversant, looks well, vitals reassuring, mild distress   Head: atraumatic, normocephalic  Eyes: periorbital ecchymosis to L, PERRL, extraocular motions smooth by diplopia with L lateral gaze, defer IOP/fluorescein as already done and optho planing to see  ENT: no epistaxis, moist mucous membranes, normal phonation, airway patent  Neck: full ROM, no midline ttp  CV: RRR, no murmurs, HDS  Pulm: lungs CTA b/l, no wheezing, no respiratory distress  GI: abd soft, non tender, no guarding/rebound/masses  Back: normal ROM, no midline ttp, no signs of trauma  Extremities: normal ROM, joints stable, distal pulses intact, no edema  Neuro: alert, oriented x3, moving all extremities, interactive, normal speech/memory, normal strength in extremities   Derm: warm, dry, normal color, no rash/wounds

## 2020-05-22 NOTE — ED PROVIDER NOTE - NS ED ROS FT
ROS:  Gen: fever ( - ) chills ( - ) weakness ( - )  Eyes: pain ( + ) visual changes ( + ) discharge ( - )  ENT: epistaxis ( - ) odynophagia ( - ) hearing changes ( - )  CV: chest pain ( - ) palpitations ( - ) syncope ( - )  Resp: cough ( - ) SOB ( - ) hemoptysis ( - )  GI: abd pain ( - ) vomiting ( - ) diarrhea ( - )   : genital pain ( - ) dysuria ( - ) vag bleeding ( - )  Derm: rash ( - ) laceration ( - ) wound ( - )  MSK: back pain ( - ) neck pain ( - ) joint pain ( - ) myalgias ( - )  Neuro: seizures ( - ) headache ( - ) AMS ( - )  Endocrine: polydipsia ( - ) polyuria ( - ) weight change ( - )

## 2020-05-22 NOTE — ED ADULT NURSE NOTE - SKIN TEMPERATURE MOISTURE
Other (Free Text): Biopsy proven\\[id number]\\nRight lateral neck\\n03/29/2019 Note Text (......Xxx Chief Complaint.): This diagnosis correlates with the Detail Level: Zone warm

## 2020-05-22 NOTE — ED PROVIDER NOTE - OBJECTIVE STATEMENT
34 yoF, healthy, BIB EMS as xfer from [place] ED for concern for L facial pain and L orbital floor fracture. Pt states she fell getting out of shower this am struck face on L side. Found to have significant floor fx requiring Optho consultation. Optho is aware of patient and planning to see pt in [facility] ED upon transfer. Pt is complaining of significant pain and dipolpia/pain with EOM. Pt's eye was examined by ED staff at [facility] ED. Found to have normal pressures, normal acuity, and no fluorescein uptake. No other complaints of pain or injuries. Does not wear contacts. CT imaging from [facility] ED included CTH/cspine which were negative for acute traumatic pathology.

## 2020-05-22 NOTE — ED PROVIDER NOTE - OBJECTIVE STATEMENT
33 y/o f with no PMHx presenting to the ED c/o pain to left face and behind left eye s/p fall today. Pt slipped and fell in shower, hit left side of face, no LOC, not on anticoagulation, no numbness/tingling. Reports +pain to left cheek and behind left eye s/p fall. Denies fever, chills, any other acute c/o.

## 2020-05-22 NOTE — ED PROVIDER NOTE - CLINICAL SUMMARY MEDICAL DECISION MAKING FREE TEXT BOX
Sin, PGY2- healthy 34 yoF, xfer from [facility] ED for L orbital floor fx, requiring optho eval, imaging otherwise neg for traumatic pathology, pt only complaint is L eye pain and diplopia, no contacts, pressure/fluorescein eval normal at OSH  mild distress, signs of L periorbital edema, PERRL, pain/diplopia with L lateral gaze in L eye, EOM smooth, no focal neuro deficits  concern for orbital floor fx with entrapment will discuss with specialist, stable at this time, additional morphine for pain, no other concern for missed injuries at this time

## 2020-05-22 NOTE — ED PROVIDER NOTE - CARE PROVIDER_API CALL
Jay Pedroza  PLASTIC SURGERY  [address]  [place] [address]  Phone: [phone]  Fax: [phone]  Follow Up Time: 7-10 Days

## 2020-05-22 NOTE — ED PROVIDER NOTE - NSFOLLOWUPCLINICS_GEN_ALL_ED_FT
[facility] Ophthalmology  Ophthalmology  [address] [place] [address] [address]  [place] [address]  Phone: [phone]  Fax:   Follow Up Time: [facility] Ophthalmology  Ophthalmology  [address] [place] [address] [address]  [place] [address]  Phone: [phone]  Fax:   Follow Up Time:

## 2020-05-22 NOTE — ED PROVIDER NOTE - PROGRESS NOTE DETAILS
Sin, PGY2- d/w with optho resident, will see in ED, do exam, review imaging and discuss with oculoplastics attending Attending MD Ch: Fernie spann Sin, PGY2- per optho no acute surgical intervention, sx of diplopia likely due to inflammation from injury, can f/u on Tuesday, will d/c with short course of oxy, ERX sent, stable for d/c Attending MD Ch: [facility]en by [facility], no acute surgical intervention.  Will send home with sinus precautions, Medrol Dosepak and pain control.  PAtient to follow up with Opho clinic on Tuesday.  Spoke with Dr. AICHA Mazariegos of Plastics, if no retrobulbar hematoma and seen by [facility] without entrapment, no acute plastics intervention, should follow up with Dr. Pedroza in two weeks.  Stable for discharge. Follow up instructions given, importance of follow up emphasized, return to ED parameters reviewed and patient verbalized understanding.  All questions answered, all concerns addressed.
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
## 2020-05-22 NOTE — ED PROVIDER NOTE - MUSCULOSKELETAL, MLM
Spine appears normal, range of motion is not limited, no muscle or joint tenderness. No stepoff to left cheek, no proptosis

## 2020-05-22 NOTE — ED PROVIDER NOTE - NSFOLLOWUPINSTRUCTIONS_ED_ALL_ED_FT
You have a fracture around your Left eye.     Per the eye doctors you do not need surgery at this time and to follow up on Tuesday in the clinic.    Take Tylenol as needed and oxycodone for breakthrough pain.    Do not use straws.    Do not blow nose.    If you have to sneeze, sneeze with an open mouth.     Please return to ER for new or concerning symptoms.

## 2020-05-22 NOTE — ED ADULT NURSE NOTE - NS ED PATIENT SAFETY CONCERN
[Dear  ___] : Dear  [unfilled], [Consult Letter:] : I had the pleasure of evaluating your patient, [unfilled]. [Please see my note below.] : Please see my note below. [Consult Closing:] : Thank you very much for allowing me to participate in the care of this patient.  If you have any questions, please do not hesitate to contact me. [FreeTextEntry2] : Aurelio Rhodes [FreeTextEntry3] : Sincerely,\par \par \par Cihkis Chase MD\par Diplomate, American Academy of Psychiatry and Neurology\par Board Certified in the Subspecialty of Clinical Neurophysiology\par Board Certified in the Subspecialty of Sleep Medicine\par Board Certified in the Subspecialty of Epilepsy\par  No

## 2020-05-22 NOTE — ED ADULT NURSE NOTE - OBJECTIVE STATEMENT
BIBA complaining of left cheek bone pain after slipping and falling out of the shower.  As per EMS, "pt slipped and fell out of the shower, hit her face and went to [place] [facility]. Pt was scanned, no brain bleeding, c-spine cleared, orbital fracture present on imaging. Pt is experiencing photophobia, and headache."  Pt states, "I was wet, got out of the shower and slipped and hit my left cheek bone and left temple on the bathtub. I didn't lose consciousness but I was lightheaded right after I fell." Pt endorses photophobia, lightheadedness and headache and cheek pain 10/10. Pt denies blurred vision, dizziness, SOB chest pain, sore throat, abdominal pain N/V/D urinary symptoms, numbness and tingling at present. PERRL and no pinned pupils at present. Pt can move her eyes in all cardinal direction.

## 2020-05-22 NOTE — ED PROVIDER NOTE - CLINICAL SUMMARY MEDICAL DECISION MAKING FREE TEXT BOX
No suggestion of retroorbital hematoma, EOMI, given report and severity of trauma, will check CT head and max-face, dispo pending pain relief and imaging.

## 2020-08-18 ENCOUNTER — EMERGENCY (EMERGENCY)
Facility: HOSPITAL | Age: 35
LOS: 0 days | Discharge: HOME | End: 2020-08-19
Attending: EMERGENCY MEDICINE
Payer: COMMERCIAL

## 2020-08-18 VITALS
TEMPERATURE: 99 F | RESPIRATION RATE: 18 BRPM | SYSTOLIC BLOOD PRESSURE: 116 MMHG | DIASTOLIC BLOOD PRESSURE: 52 MMHG | HEART RATE: 68 BPM | OXYGEN SATURATION: 98 %

## 2020-08-18 VITALS — WEIGHT: 130.07 LBS | HEIGHT: 63 IN

## 2020-08-18 DIAGNOSIS — T63.441A TOXIC EFFECT OF VENOM OF BEES, ACCIDENTAL (UNINTENTIONAL), INITIAL ENCOUNTER: ICD-10-CM
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
## 2020-08-18 DIAGNOSIS — M79.89 OTHER SPECIFIED SOFT TISSUE DISORDERS: ICD-10-CM

## 2020-08-18 PROCEDURE — 99283 EMERGENCY DEPT VISIT LOW MDM: CPT

## 2020-08-18 RX ORDER — EPINEPHRINE 0.3 MG/.3ML
0.3 INJECTION INTRAMUSCULAR; SUBCUTANEOUS
Qty: 0.3 | Refills: 0
Start: 2020-08-18 | End: 2020-08-18

## 2020-08-18 RX ORDER — FAMOTIDINE 10 MG/ML
20 INJECTION INTRAVENOUS ONCE
Refills: 0 | Status: COMPLETED | OUTPATIENT
Start: 2020-08-18 | End: 2020-08-18

## 2020-08-18 RX ADMIN — FAMOTIDINE 20 MILLIGRAM(S): 10 INJECTION INTRAVENOUS at 23:56

## 2020-08-18 RX ADMIN — Medication 40 MILLIGRAM(S): at 23:56

## 2020-08-18 NOTE — ED ADULT TRIAGE NOTE - CHIEF COMPLAINT QUOTE
Pt c/o being stung by a bee at 4:30pm today in the left had. Pt with left lower arm and hand swelling. Pt denies shortness of breath, dizziness. Pt respirations regular and even. Pt benadryl and motrin at 5:30pm. Pt with edema to left lower arm/hand. Pt denies allergies to bees Pt c/o being stung by a bee at 4:30pm today in the left hand. Pt with left lower arm and hand swelling. Pt denies shortness of breath, dizziness. Pt respirations regular and even. Pt benadryl and motrin at 5:30pm. Pt with edema to left lower arm/hand. Pt denies allergies to bees

## 2020-08-18 NOTE — ED STATDOCS - OBJECTIVE STATEMENT
35F presents to the ED with left hand swelling. pt states that she was stung by a bee on the left hand earlier this afternoon - pt states she was trying to swat the bee out from under her umbrella when it stung her. pt took benadryl however now noticing significant swelling to left hand. no tongue or throat swelling. no sob or wheezing. itching to hand only. no vomiting or diarrhea. never had bee sting before.

## 2020-08-18 NOTE — ED STATDOCS - NSFOLLOWUPINSTRUCTIONS_ED_ALL_ED_FT
1. return for worsening symptoms or anything concerning to you  2. take all home meds as prescribed  3. follow up with your pmd call to make an appointment  4. take benadryl as needed for swelling/itching as directed  5. take steroids as directed  6.  epi pen at pharmacy use for anaphylaxis only  7. elevate and ice    General Allergic Reaction    WHAT YOU NEED TO KNOW:    An allergic reaction is your body's response to an allergen. Allergens include medicines, food, insect stings, animal dander, mold, latex, chemicals, and dust mites. Pollen from trees, grass, and weeds can also cause an allergic reaction. An allergic reaction can range from mild to severe.    DISCHARGE INSTRUCTIONS:    Call 911 for signs or symptoms of anaphylaxis, such as trouble breathing, swelling in your mouth or throat, or wheezing. You may also have itching, a rash, hives, or feel like you are going to faint.    Return to the emergency department if:     You have a skin rash, hives, swelling, or itching that is starting to get worse.      Your throat tightens, or your lips or tongue swell.      You have trouble swallowing or speaking.      You have worsening nausea, diarrhea, or abdominal cramps, or you are vomiting.      You have chest pain or tightness.    Contact your healthcare provider if:     You have questions or concerns about your condition or care.        Medicines: You may need any of the following:     Medicines may be given to relieve certain allergy symptoms such as itching, sneezing, and swelling. You may take them as a pill or use drops in your nose or eyes. Topical treatments may be given to put directly on your skin to help decrease itching or swelling.      Epinephrine may be prescribed if you are at risk for anaphylaxis. This is a severe allergic reaction that can be life-threatening. Your healthcare provider will tell you if you need to keep epinephrine with you. You will be taught when and how to use it.      Take your medicine as directed. Contact your healthcare provider if you think your medicine is not helping or if you have side effects. Tell him of her if you are allergic to any medicine. Keep a list of the medicines, vitamins, and herbs you take. Include the amounts, and when and why you take them. Bring the list or the pill bottles to follow-up visits. Carry your medicine list with you in case of an emergency.    Follow up with your healthcare provider as directed: Write down your questions so you remember to ask them during your visits.     Manage your symptoms:     Avoid allergens. You may need to have allergy testing with your healthcare provider or a specialist to find your allergens.      Use cold compresses on your skin or eyes. This will help soothe skin or eyes affected by the allergic reaction. You can make a cold compress by soaking a washcloth in cool water. Wring out the extra water before you apply the washcloth.      Rinse your nasal passages with a saline solution. Daily rinsing may help clear allergens out of your nose. Use distilled water if possible. You can also boil tap water and then let it cool before you use it. Do not use tap water without boiling it first.      Do not smoke. Nicotine and other chemicals in cigarettes and cigars can make an allergic reaction worse, and can also cause lung damage. Ask your healthcare provider for information if you currently smoke and need help to quit. E-cigarettes or smokeless tobacco still contain nicotine. Talk to your healthcare provider before you use these products.

## 2020-08-18 NOTE — ED STATDOCS - PHYSICAL EXAMINATION
Constitutional: NAD AAOx3  Eyes: PERRLA EOMI  Head: Normocephalic atraumatic  ent normal posterior pharynx no tongue uvula or lip swelling  Mouth: MMM  Cardiac: regular rate   Resp: Lungs CTAB  GI: Abd s/nt/nd  Neuro: CN2-12 intact  Skin: no redness or warmth  msk: swelling to left hand normal peripheral pulses compartments soft normal ROM.

## 2020-08-18 NOTE — ED STATDOCS - PATIENT PORTAL LINK FT
You can access the FollowMyHealth Patient Portal offered by [facility] by registering at the following website: [email or web address]. By joining "[employer]’s FollowMyHealth portal, you will also be able to view your health information using other applications (apps) compatible with our system.

## 2020-08-18 NOTE — ED STATDOCS - CLINICAL SUMMARY MEDICAL DECISION MAKING FREE TEXT BOX
35F presents to the ED with left hand swelling. pt states that she was stung by a bee on the left hand earlier this afternoon - pt states she was trying to swat the bee out from under her umbrella when it stung her. pt took benadryl however now noticing significant swelling to left hand. no tongue or throat swelling. no sob or wheezing. itching to hand only. no vomiting or diarrhea. never had bee sting before. exam with no redness or warmth  msk: swelling to left hand normal peripheral pulses compartments soft normal ROM. no signs of anaphylaxis or cellulitis. will rx antihistamine, steroids. ice elevate. epipen rx for anaphylaxis only. Jimbo Mccann M.D., Attending Physician

## 2020-08-18 NOTE — ED STATDOCS - NS ED ROS FT
Constitutional: No fever or chills  Eyes: No visual changes  HEENT: No throat pain  CV: No chest pain  Resp: No SOB no cough  GI: No abd pain, nausea or vomiting  : No dysuria  MSK: No musculoskeletal pain  Skin: + swelling to left hand.   Neuro: No headache

## 2020-08-19 NOTE — ED ADULT NURSE NOTE - CHIEF COMPLAINT QUOTE
Pt c/o being stung by a bee at 4:30pm today in the left hand. Pt with left lower arm and hand swelling. Pt denies shortness of breath, dizziness. Pt respirations regular and even. Pt benadryl and motrin at 5:30pm. Pt with edema to left lower arm/hand. Pt denies allergies to bees

## 2020-08-31 ENCOUNTER — OUTPATIENT (OUTPATIENT)
Dept: OUTPATIENT SERVICES | Facility: HOSPITAL | Age: 35
LOS: 1 days | Discharge: HOME | End: 2020-08-31

## 2020-08-31 VITALS
OXYGEN SATURATION: 100 % | WEIGHT: 153 LBS | TEMPERATURE: 99 F | SYSTOLIC BLOOD PRESSURE: 122 MMHG | HEART RATE: 63 BPM | RESPIRATION RATE: 17 BRPM | DIASTOLIC BLOOD PRESSURE: 70 MMHG | HEIGHT: 63 IN

## 2020-08-31 DIAGNOSIS — M16.12 UNILATERAL PRIMARY OSTEOARTHRITIS, LEFT HIP: ICD-10-CM

## 2020-08-31 DIAGNOSIS — Z98.891 HISTORY OF UTERINE SCAR FROM PREVIOUS SURGERY: Chronic | ICD-10-CM

## 2020-08-31 DIAGNOSIS — Z01.818 ENCOUNTER FOR OTHER PREPROCEDURAL EXAMINATION: ICD-10-CM

## 2020-08-31 LAB
ANION GAP SERPL CALC-SCNC: 5 MMOL/L — SIGNIFICANT CHANGE UP (ref 5–17)
APTT BLD: 27.5 SEC — SIGNIFICANT CHANGE UP (ref 27.5–35.5)
BUN SERPL-MCNC: 10 MG/DL — SIGNIFICANT CHANGE UP (ref 7–23)
CALCIUM SERPL-MCNC: 8.9 MG/DL — SIGNIFICANT CHANGE UP (ref 8.5–10.1)
CHLORIDE SERPL-SCNC: 109 MMOL/L — HIGH (ref 96–108)
CO2 SERPL-SCNC: 28 MMOL/L — SIGNIFICANT CHANGE UP (ref 22–31)
CREAT SERPL-MCNC: 0.7 MG/DL — SIGNIFICANT CHANGE UP (ref 0.5–1.3)
GLUCOSE SERPL-MCNC: 69 MG/DL — LOW (ref 70–99)
HCG UR QL: NEGATIVE — SIGNIFICANT CHANGE UP
HCT VFR BLD CALC: 29.9 % — LOW (ref 34.5–45)
HGB BLD-MCNC: 10.8 G/DL — LOW (ref 11.5–15.5)
INR BLD: 0.99 RATIO — SIGNIFICANT CHANGE UP (ref 0.88–1.16)
MCHC RBC-ENTMCNC: 23.3 PG — LOW (ref 27–34)
MCHC RBC-ENTMCNC: 36.1 GM/DL — HIGH (ref 32–36)
MCV RBC AUTO: 64.4 FL — LOW (ref 80–100)
MRSA PCR RESULT.: SIGNIFICANT CHANGE UP
NRBC # BLD: 0 /100 WBCS — SIGNIFICANT CHANGE UP (ref 0–0)
PLATELET # BLD AUTO: 222 K/UL — SIGNIFICANT CHANGE UP (ref 150–400)
POTASSIUM SERPL-MCNC: 4.1 MMOL/L — SIGNIFICANT CHANGE UP (ref 3.5–5.3)
POTASSIUM SERPL-SCNC: 4.1 MMOL/L — SIGNIFICANT CHANGE UP (ref 3.5–5.3)
PROTHROM AB SERPL-ACNC: 11.5 SEC — SIGNIFICANT CHANGE UP (ref 10.6–13.6)
RBC # BLD: 4.64 M/UL — SIGNIFICANT CHANGE UP (ref 3.8–5.2)
RBC # FLD: 16.2 % — HIGH (ref 10.3–14.5)
S AUREUS DNA NOSE QL NAA+PROBE: SIGNIFICANT CHANGE UP
SODIUM SERPL-SCNC: 142 MMOL/L — SIGNIFICANT CHANGE UP (ref 135–145)
WBC # BLD: 9.4 K/UL — SIGNIFICANT CHANGE UP (ref 3.8–10.5)
WBC # FLD AUTO: 9.4 K/UL — SIGNIFICANT CHANGE UP (ref 3.8–10.5)

## 2020-08-31 NOTE — OCCUPATIONAL THERAPY INITIAL EVALUATION ADULT - PERTINENT HX OF CURRENT PROBLEM, REHAB EVAL
Pt is a 36y/o female slated for elective surgery for left THR at a later date with MD Navarro due to OA, pain and DJD. . Pt stated she fell a year ago and fractured  her left hip. No surgical intervention was done. Left hip was left to heal by itself and subsequently pt was referred  for out patient PT which is still ongoing. Pt is a 34y/o female slated for elective surgery for left THR at a later date with MD Navarro due to OA, pain and DJD. Pt stated she fell a year ago and fractured  her left hip. No surgical intervention was done. Left hip was left to heal by itself and subsequently pt was referred  for out patient PT which is still ongoing.

## 2020-08-31 NOTE — OCCUPATIONAL THERAPY INITIAL EVALUATION ADULT - LIVES WITH, PROFILE
2 teen age daughter and a 4 year old in a private house with 3 steps equipped with bilateral hand rails that cannot be reached simultaneously . Once inside, pt has to negotiate a flight of stairs with 12-15 steps, left ascending handrail, to access the bedroom and bathroom.  The home has 2 bathrooms. One bathroom has a tub/shower combination, grab  bars , fixed / retractable shower head and standard toilet. The other bathroom has a stall shower, grab bars, fixed/ retractable shower head and standard toilet. Each toilet has adequate space to fit a commode over it./spouse

## 2020-08-31 NOTE — OCCUPATIONAL THERAPY INITIAL EVALUATION ADULT - ADDITIONAL COMMENTS
Presently pt is functioning in her roles, self sufficient, driving & ambulating independently in the community without any assistive devices. Pt owns no DME . Pt is right hand dominant and wears glasses for reading. Pt c/o chronic 10/10 pain in her left hip. This impacts ambulation, stair negotiation, work related tasks and ADL management . Pt takes Tylenol and aleve PRN for pain relief. Pt scores 90% of patient specific scale .

## 2020-08-31 NOTE — OCCUPATIONAL THERAPY INITIAL EVALUATION ADULT - PATIENT/FAMILY/SIGNIFICANT OTHER GOALS STATEMENT, OT EVAL
Pt would like to be restored to prior level of function and be able to live and ambulate without pain.

## 2020-08-31 NOTE — H&P PST ADULT - NSICDXPROBLEM_GEN_ALL_CORE_FT
PROBLEM DIAGNOSES  Problem: Unilateral primary osteoarthritis, left hip  Assessment and Plan: Preop instructions provided including npo status, Hibiclens wash for infection control and ensure/ mrsa protocols. Pt to c/w current meds, aware to stop any NSAIDS, OTC herbals or MVI on 9/3/20. Verbilized understanding. BW done, pending results. DVT prophylasix as per primary team.  Aware to f/u on covid testing prior to sx as per pst protocol and will make appt for 9/7-8 at [facility].

## 2020-08-31 NOTE — PHYSICAL THERAPY INITIAL EVALUATION ADULT - MODIFIED CLINICAL TEST OF SENSORY INTEGRATION IN BALANCE TEST
5x Sit to Stand Test = 13 seconds, indicating significant impairment c functional mobility & strength  ; 2 Minute Walk Test = 211 feet without devices or rest stops, Pain rating 5/10, measured for baseline recording

## 2020-08-31 NOTE — H&P PST ADULT - NSANTHOSAYNRD_GEN_A_CORE
Denies sleep studies done in the past/No. KANDIS screening performed.  STOP BANG Legend: 0-2 = LOW Risk; 3-4 = INTERMEDIATE Risk; 5-8 = HIGH Risk

## 2020-08-31 NOTE — PHYSICAL THERAPY INITIAL EVALUATION ADULT - GENERAL OBSERVATIONS, REHAB EVAL
Patient encountered sitting in PST waiting area, agreeable to PT pre-op evaluation. Patient is for elective left total hip arthroplasty at a later date from now.

## 2020-08-31 NOTE — PHYSICAL THERAPY INITIAL EVALUATION ADULT - PERTINENT HX OF CURRENT PROBLEM, REHAB EVAL
Patient attends Pre-Op Testing today following consult with   due to chronic pain to left hip. Significant surgical/medical histories of low back pain and sciatica. Elective LTHA is now scheduled in this facility for 9/10/20.

## 2020-08-31 NOTE — H&P PST ADULT - HISTORY OF PRESENT ILLNESS
34 y/o F presents to PST w/ a preop dx of unilateral primary osteoarthritis, left hip and to be evaluated for a scheduled left total hip arthroplasty on 9/10/20.  Pt states pain to left hip x1 year after a fall on concrete on a driveway. Went to the ED the next day and a collateral fx of femur diagnosed Pt underwent cortisone injection, PT and trigger point therapy w/ partial relief. Pt re evaluated and severe OA noted, recommended sx at this time.

## 2020-08-31 NOTE — OCCUPATIONAL THERAPY INITIAL EVALUATION ADULT - RANGE OF MOTION EXAMINATION, LOWER EXTREMITY
ROM is limited in left hip due to pain./Left LE Passive ROM was WFL (w/i functional limits)/Left LE Active Assistive ROM was WFL (within functional limits)/Right LE Active ROM was WNL(within normal limits)/Right LE Passive ROM was WNL (within normal limits)

## 2020-08-31 NOTE — H&P PST ADULT - RS GEN PE MLT RESP DETAILS PC
respirations non-labored/clear to auscultation bilaterally/good air movement/no subcutaneous emphysema/no chest wall tenderness/no intercostal retractions/breath sounds equal/no rales/airway patent/no wheezes/no rhonchi

## 2020-08-31 NOTE — H&P PST ADULT - NEGATIVE CARDIOVASCULAR SYMPTOMS
no palpitations/no paroxysmal nocturnal dyspnea/no chest pain/no claudication/no dyspnea on exertion/no orthopnea/no peripheral edema

## 2020-08-31 NOTE — H&P PST ADULT - NEGATIVE ALLERGY TYPES
no reactions to animals/no outdoor environmental allergies/no indoor environmental allergies/no reactions to medicines/no reactions to food

## 2020-08-31 NOTE — PHYSICAL THERAPY INITIAL EVALUATION ADULT - ADDITIONAL COMMENTS
Patient endorses typical pain ia always at 10/10. Per patient, pain is worse with increased sitting, crouching down and standing. Pain is relieved by taking tylenol, alleve and marijuana use. Home set-up: lives with  & children in private house with 3 stair steps with x2 handrails (far apart) to enter at front. Bedroom is at 2nd floor with 12 stair steps to negotiate, x1 left handrail. Bathroom at each floor is a shower/tub combination with grab rails, standard height toilet seats, with fixed + retractable shower heads. Endorses will be supported by post-op by . Patient is currently independent with mobility. Reports owns a pair of axillary crutches. Patient  is right-handed and drives; wears glasses for reading. Patient denies falls in past 6 months. Reports buckling 1-2x/wk.

## 2020-08-31 NOTE — H&P PST ADULT - ASSESSMENT
DX: unilateral primary osteoarthritis, left hip and evaluated for a scheduled left total hip arthroplasty on 9/10/20

## 2020-08-31 NOTE — H&P PST ADULT - MUSCULOSKELETAL
details… detailed exam limping at times, no devices in use/diminished strength/no joint warmth/no calf tenderness/decreased ROM due to pain/no joint swelling/no joint erythema

## 2020-09-01 LAB
A1C WITH ESTIMATED AVERAGE GLUCOSE RESULT: 4.5 % — SIGNIFICANT CHANGE UP (ref 4–5.6)
ESTIMATED AVERAGE GLUCOSE: 82 MG/DL — SIGNIFICANT CHANGE UP (ref 68–114)

## 2020-09-07 ENCOUNTER — OUTPATIENT (OUTPATIENT)
Dept: OUTPATIENT SERVICES | Facility: HOSPITAL | Age: 35
LOS: 1 days | Discharge: HOME | End: 2020-09-07

## 2020-09-07 DIAGNOSIS — Z98.891 HISTORY OF UTERINE SCAR FROM PREVIOUS SURGERY: Chronic | ICD-10-CM

## 2020-09-07 DIAGNOSIS — U07.1 COVID-19: ICD-10-CM

## 2020-09-07 LAB — SARS-COV-2 RNA SPEC QL NAA+PROBE: SIGNIFICANT CHANGE UP

## 2020-09-09 ENCOUNTER — TRANSCRIPTION ENCOUNTER (OUTPATIENT)
Age: 35
End: 2020-09-09

## 2020-09-10 ENCOUNTER — RESULT REVIEW (OUTPATIENT)
Age: 35
End: 2020-09-10

## 2020-09-10 ENCOUNTER — INPATIENT (INPATIENT)
Facility: HOSPITAL | Age: 35
LOS: 0 days | Discharge: HOME HEALTH | End: 2020-09-11
Attending: ORTHOPAEDIC SURGERY | Admitting: ORTHOPAEDIC SURGERY
Payer: COMMERCIAL

## 2020-09-10 ENCOUNTER — TRANSCRIPTION ENCOUNTER (OUTPATIENT)
Age: 35
End: 2020-09-10

## 2020-09-10 VITALS
SYSTOLIC BLOOD PRESSURE: 105 MMHG | TEMPERATURE: 98 F | HEART RATE: 56 BPM | HEIGHT: 63 IN | OXYGEN SATURATION: 100 % | WEIGHT: 130.07 LBS | RESPIRATION RATE: 16 BRPM | DIASTOLIC BLOOD PRESSURE: 40 MMHG

## 2020-09-10 DIAGNOSIS — Z98.891 HISTORY OF UTERINE SCAR FROM PREVIOUS SURGERY: Chronic | ICD-10-CM

## 2020-09-10 LAB
ANION GAP SERPL CALC-SCNC: 6 MMOL/L — SIGNIFICANT CHANGE UP (ref 5–17)
BUN SERPL-MCNC: 9 MG/DL — SIGNIFICANT CHANGE UP (ref 7–23)
CALCIUM SERPL-MCNC: 8.2 MG/DL — LOW (ref 8.5–10.1)
CHLORIDE SERPL-SCNC: 107 MMOL/L — SIGNIFICANT CHANGE UP (ref 96–108)
CO2 SERPL-SCNC: 27 MMOL/L — SIGNIFICANT CHANGE UP (ref 22–31)
CREAT SERPL-MCNC: 0.55 MG/DL — SIGNIFICANT CHANGE UP (ref 0.5–1.3)
GLUCOSE SERPL-MCNC: 103 MG/DL — HIGH (ref 70–99)
HCG UR QL: NEGATIVE — SIGNIFICANT CHANGE UP
HCT VFR BLD CALC: 26.4 % — LOW (ref 34.5–45)
HGB BLD-MCNC: 9.6 G/DL — LOW (ref 11.5–15.5)
MCHC RBC-ENTMCNC: 23.6 PG — LOW (ref 27–34)
MCHC RBC-ENTMCNC: 36.4 GM/DL — HIGH (ref 32–36)
MCV RBC AUTO: 64.9 FL — LOW (ref 80–100)
NRBC # BLD: 0 /100 WBCS — SIGNIFICANT CHANGE UP (ref 0–0)
PLATELET # BLD AUTO: 172 K/UL — SIGNIFICANT CHANGE UP (ref 150–400)
POTASSIUM SERPL-MCNC: 3.8 MMOL/L — SIGNIFICANT CHANGE UP (ref 3.5–5.3)
POTASSIUM SERPL-SCNC: 3.8 MMOL/L — SIGNIFICANT CHANGE UP (ref 3.5–5.3)
RBC # BLD: 4.07 M/UL — SIGNIFICANT CHANGE UP (ref 3.8–5.2)
RBC # FLD: 15.3 % — HIGH (ref 10.3–14.5)
SODIUM SERPL-SCNC: 140 MMOL/L — SIGNIFICANT CHANGE UP (ref 135–145)
WBC # BLD: 11.44 K/UL — HIGH (ref 3.8–10.5)
WBC # FLD AUTO: 11.44 K/UL — HIGH (ref 3.8–10.5)

## 2020-09-10 PROCEDURE — 88305 TISSUE EXAM BY PATHOLOGIST: CPT | Mod: 26

## 2020-09-10 PROCEDURE — 88311 DECALCIFY TISSUE: CPT | Mod: 26

## 2020-09-10 PROCEDURE — 73501 X-RAY EXAM HIP UNI 1 VIEW: CPT | Mod: 26

## 2020-09-10 RX ORDER — OXYCODONE HYDROCHLORIDE 5 MG/1
10 TABLET ORAL EVERY 4 HOURS
Refills: 0 | Status: DISCONTINUED | OUTPATIENT
Start: 2020-09-10 | End: 2020-09-11

## 2020-09-10 RX ORDER — SODIUM CHLORIDE 9 MG/ML
1000 INJECTION INTRAMUSCULAR; INTRAVENOUS; SUBCUTANEOUS ONCE
Refills: 0 | Status: COMPLETED | OUTPATIENT
Start: 2020-09-10 | End: 2020-09-10

## 2020-09-10 RX ORDER — HYDROMORPHONE HYDROCHLORIDE 2 MG/ML
0.5 INJECTION INTRAMUSCULAR; INTRAVENOUS; SUBCUTANEOUS
Refills: 0 | Status: DISCONTINUED | OUTPATIENT
Start: 2020-09-10 | End: 2020-09-10

## 2020-09-10 RX ORDER — POLYETHYLENE GLYCOL 3350 17 G/17G
17 POWDER, FOR SOLUTION ORAL DAILY
Refills: 0 | Status: DISCONTINUED | OUTPATIENT
Start: 2020-09-10 | End: 2020-09-11

## 2020-09-10 RX ORDER — CELECOXIB 200 MG/1
200 CAPSULE ORAL ONCE
Refills: 0 | Status: COMPLETED | OUTPATIENT
Start: 2020-09-10 | End: 2020-09-10

## 2020-09-10 RX ORDER — PANTOPRAZOLE SODIUM 20 MG/1
40 TABLET, DELAYED RELEASE ORAL
Refills: 0 | Status: DISCONTINUED | OUTPATIENT
Start: 2020-09-10 | End: 2020-09-11

## 2020-09-10 RX ORDER — ONDANSETRON 8 MG/1
4 TABLET, FILM COATED ORAL ONCE
Refills: 0 | Status: COMPLETED | OUTPATIENT
Start: 2020-09-10 | End: 2020-09-10

## 2020-09-10 RX ORDER — DIPHENHYDRAMINE HCL 50 MG
25 CAPSULE ORAL ONCE
Refills: 0 | Status: COMPLETED | OUTPATIENT
Start: 2020-09-10 | End: 2020-09-10

## 2020-09-10 RX ORDER — SODIUM CHLORIDE 9 MG/ML
1000 INJECTION, SOLUTION INTRAVENOUS
Refills: 0 | Status: DISCONTINUED | OUTPATIENT
Start: 2020-09-10 | End: 2020-09-11

## 2020-09-10 RX ORDER — SENNA PLUS 8.6 MG/1
2 TABLET ORAL AT BEDTIME
Refills: 0 | Status: DISCONTINUED | OUTPATIENT
Start: 2020-09-10 | End: 2020-09-11

## 2020-09-10 RX ORDER — ONDANSETRON 8 MG/1
4 TABLET, FILM COATED ORAL ONCE
Refills: 0 | Status: DISCONTINUED | OUTPATIENT
Start: 2020-09-10 | End: 2020-09-10

## 2020-09-10 RX ORDER — DEXAMETHASONE 0.5 MG/5ML
10 ELIXIR ORAL ONCE
Refills: 0 | Status: COMPLETED | OUTPATIENT
Start: 2020-09-11 | End: 2020-09-11

## 2020-09-10 RX ORDER — ONDANSETRON 8 MG/1
4 TABLET, FILM COATED ORAL EVERY 6 HOURS
Refills: 0 | Status: DISCONTINUED | OUTPATIENT
Start: 2020-09-10 | End: 2020-09-11

## 2020-09-10 RX ORDER — ACETAMINOPHEN 500 MG
650 TABLET ORAL ONCE
Refills: 0 | Status: COMPLETED | OUTPATIENT
Start: 2020-09-10 | End: 2020-09-10

## 2020-09-10 RX ORDER — HYDROMORPHONE HYDROCHLORIDE 2 MG/ML
0.25 INJECTION INTRAMUSCULAR; INTRAVENOUS; SUBCUTANEOUS
Refills: 0 | Status: DISCONTINUED | OUTPATIENT
Start: 2020-09-10 | End: 2020-09-10

## 2020-09-10 RX ORDER — ACETAMINOPHEN 500 MG
1000 TABLET ORAL ONCE
Refills: 0 | Status: COMPLETED | OUTPATIENT
Start: 2020-09-10 | End: 2020-09-10

## 2020-09-10 RX ORDER — HYDROMORPHONE HYDROCHLORIDE 2 MG/ML
0.5 INJECTION INTRAMUSCULAR; INTRAVENOUS; SUBCUTANEOUS EVERY 4 HOURS
Refills: 0 | Status: DISCONTINUED | OUTPATIENT
Start: 2020-09-10 | End: 2020-09-11

## 2020-09-10 RX ORDER — MAGNESIUM HYDROXIDE 400 MG/1
30 TABLET, CHEWABLE ORAL DAILY
Refills: 0 | Status: DISCONTINUED | OUTPATIENT
Start: 2020-09-10 | End: 2020-09-11

## 2020-09-10 RX ORDER — ASCORBIC ACID 60 MG
500 TABLET,CHEWABLE ORAL
Refills: 0 | Status: DISCONTINUED | OUTPATIENT
Start: 2020-09-10 | End: 2020-09-11

## 2020-09-10 RX ORDER — ACETAMINOPHEN 500 MG
1000 TABLET ORAL ONCE
Refills: 0 | Status: COMPLETED | OUTPATIENT
Start: 2020-09-10 | End: 2020-09-11

## 2020-09-10 RX ORDER — CELECOXIB 200 MG/1
200 CAPSULE ORAL
Refills: 0 | Status: DISCONTINUED | OUTPATIENT
Start: 2020-09-11 | End: 2020-09-11

## 2020-09-10 RX ORDER — SODIUM CHLORIDE 9 MG/ML
1000 INJECTION, SOLUTION INTRAVENOUS
Refills: 0 | Status: DISCONTINUED | OUTPATIENT
Start: 2020-09-10 | End: 2020-09-10

## 2020-09-10 RX ORDER — OXYCODONE HYDROCHLORIDE 5 MG/1
5 TABLET ORAL EVERY 4 HOURS
Refills: 0 | Status: COMPLETED | OUTPATIENT
Start: 2020-09-10 | End: 2020-09-11

## 2020-09-10 RX ORDER — ASPIRIN/CALCIUM CARB/MAGNESIUM 324 MG
81 TABLET ORAL
Refills: 0 | Status: DISCONTINUED | OUTPATIENT
Start: 2020-09-11 | End: 2020-09-11

## 2020-09-10 RX ORDER — ACETAMINOPHEN 500 MG
650 TABLET ORAL EVERY 6 HOURS
Refills: 0 | Status: DISCONTINUED | OUTPATIENT
Start: 2020-09-10 | End: 2020-09-11

## 2020-09-10 RX ORDER — CEFAZOLIN SODIUM 1 G
2000 VIAL (EA) INJECTION EVERY 8 HOURS
Refills: 0 | Status: COMPLETED | OUTPATIENT
Start: 2020-09-10 | End: 2020-09-11

## 2020-09-10 RX ORDER — TRANEXAMIC ACID 100 MG/ML
1000 INJECTION, SOLUTION INTRAVENOUS ONCE
Refills: 0 | Status: COMPLETED | OUTPATIENT
Start: 2020-09-10 | End: 2020-09-10

## 2020-09-10 RX ORDER — OXYCODONE HYDROCHLORIDE 5 MG/1
5 TABLET ORAL EVERY 4 HOURS
Refills: 0 | Status: DISCONTINUED | OUTPATIENT
Start: 2020-09-10 | End: 2020-09-11

## 2020-09-10 RX ADMIN — OXYCODONE HYDROCHLORIDE 5 MILLIGRAM(S): 5 TABLET ORAL at 17:50

## 2020-09-10 RX ADMIN — HYDROMORPHONE HYDROCHLORIDE 0.5 MILLIGRAM(S): 2 INJECTION INTRAMUSCULAR; INTRAVENOUS; SUBCUTANEOUS at 22:15

## 2020-09-10 RX ADMIN — Medication 400 MILLIGRAM(S): at 16:51

## 2020-09-10 RX ADMIN — TRANEXAMIC ACID 220 MILLIGRAM(S): 100 INJECTION, SOLUTION INTRAVENOUS at 12:49

## 2020-09-10 RX ADMIN — Medication 500 MILLIGRAM(S): at 17:50

## 2020-09-10 RX ADMIN — OXYCODONE HYDROCHLORIDE 10 MILLIGRAM(S): 5 TABLET ORAL at 20:53

## 2020-09-10 RX ADMIN — Medication 100 MILLIGRAM(S): at 17:21

## 2020-09-10 RX ADMIN — OXYCODONE HYDROCHLORIDE 10 MILLIGRAM(S): 5 TABLET ORAL at 23:30

## 2020-09-10 RX ADMIN — SODIUM CHLORIDE 100 MILLILITER(S): 9 INJECTION, SOLUTION INTRAVENOUS at 13:24

## 2020-09-10 RX ADMIN — ONDANSETRON 4 MILLIGRAM(S): 8 TABLET, FILM COATED ORAL at 12:48

## 2020-09-10 RX ADMIN — OXYCODONE HYDROCHLORIDE 5 MILLIGRAM(S): 5 TABLET ORAL at 18:50

## 2020-09-10 RX ADMIN — SODIUM CHLORIDE 125 MILLILITER(S): 9 INJECTION, SOLUTION INTRAVENOUS at 16:52

## 2020-09-10 RX ADMIN — Medication 650 MILLIGRAM(S): at 08:49

## 2020-09-10 RX ADMIN — Medication 25 MILLIGRAM(S): at 23:33

## 2020-09-10 RX ADMIN — HYDROMORPHONE HYDROCHLORIDE 0.5 MILLIGRAM(S): 2 INJECTION INTRAMUSCULAR; INTRAVENOUS; SUBCUTANEOUS at 22:00

## 2020-09-10 RX ADMIN — CELECOXIB 200 MILLIGRAM(S): 200 CAPSULE ORAL at 08:48

## 2020-09-10 RX ADMIN — OXYCODONE HYDROCHLORIDE 10 MILLIGRAM(S): 5 TABLET ORAL at 19:53

## 2020-09-10 RX ADMIN — Medication 1000 MILLIGRAM(S): at 17:50

## 2020-09-10 RX ADMIN — SODIUM CHLORIDE 500 MILLILITER(S): 9 INJECTION INTRAMUSCULAR; INTRAVENOUS; SUBCUTANEOUS at 19:48

## 2020-09-10 NOTE — PHYSICAL THERAPY INITIAL EVALUATION ADULT - STRENGTHENING, PT EVAL
Improve strength in B LE to 5/5 and be able to perform functional tasks-bed mobility, sitting, standing, transfers and ambulate in a safe manner with or without  assistive device and prevent falls.

## 2020-09-10 NOTE — CONSULT NOTE ADULT - SUBJECTIVE AND OBJECTIVE BOX
LARISSA EDWARDS is a 35y Female s/p LEFT TOTAL HIP ARTHROPLASTY    w/ h/o No pertinent past medical history    denies any chest pain shortness of breath palpitation dizziness lightheadedness nausea vomiting fever or chills    History of  section    No pertinent family history in first degree relatives    SH: doesnot smoke or drink at this time    No Known Allergies    acetaminophen   Tablet .. 650 milliGRAM(s) Oral every 6 hours  aluminum hydroxide/magnesium hydroxide/simethicone Suspension 30 milliLiter(s) Oral four times a day PRN  ascorbic acid 500 milliGRAM(s) Oral two times a day  ceFAZolin   IVPB 2000 milliGRAM(s) IV Intermittent every 8 hours  HYDROmorphone  Injectable 0.5 milliGRAM(s) IV Push every 4 hours PRN  lactated ringers. 1000 milliLiter(s) IV Continuous <Continuous>  magnesium hydroxide Suspension 30 milliLiter(s) Oral daily PRN  multivitamin 1 Tablet(s) Oral daily  ondansetron Injectable 4 milliGRAM(s) IV Push every 6 hours PRN  oxyCODONE    IR 5 milliGRAM(s) Oral every 4 hours  oxyCODONE    IR 5 milliGRAM(s) Oral every 4 hours PRN  oxyCODONE    IR 10 milliGRAM(s) Oral every 4 hours PRN  pantoprazole    Tablet 40 milliGRAM(s) Oral before breakfast  polyethylene glycol 3350 17 Gram(s) Oral daily  senna 2 Tablet(s) Oral at bedtime PRN    T(C): 36.6 (09-10-20 @ 17:49), Max: 36.8 (09-10-20 @ 08:10)  HR: 60 (09-10-20 @ 19:40) (54 - 141)  BP: 94/43 (09-10-20 @ 19:40) (74/34 - 137/91)  RR: 18 (09-10-20 @ 19:40) (15 - 26)  SpO2: 96% (09-10-20 @ 19:40) (96% - 100%)  HEENT unremarkable  neck no JVD or bruit  heart normal S1 S2 RRR no gallops or rubs  chest clear to auscultation  abd sof nontender non distended +bs  ext no calf tenderness    A/P   DVT PX  pain control  bowel regimen   wound care as per ortho  GI PX  antiemetics prn  incentive spirometer

## 2020-09-10 NOTE — PHYSICAL THERAPY INITIAL EVALUATION ADULT - ACTIVE RANGE OF MOTION EXAMINATION, REHAB EVAL
Right LE Active ROM was WFL (within functional limits)/deficits as listed below/L hip posterior dislocation: 90 degrees flexion, no ADD pass midline, no IR

## 2020-09-10 NOTE — PROGRESS NOTE ADULT - SUBJECTIVE AND OBJECTIVE BOX
Post-op Check   POD#0 S/P Left AUGUSTUS  35yFemale Patient seen and examined, Pain controlled  Patient Denies SOB, CP, N/V/D       PE: Left Hip/LE: Dressing C/D/I, Sensation/motor intact, DP 2+, FROM ankle/toes   B/L LE: Skin intact. +ROM hip/knee/ankle/toes. Ankle Dorsi/plantarflexion: 5/5. Calf: soft, compressible and nontender. DP/PT 2+ NVI                          9.6    11.44 )-----------( 172      ( 10 Sep 2020 12:35 )             26.4       09-10    140  |  107  |  9   ----------------------------<  103<H>  3.8   |  27  |  0.55    Ca    8.2<L>      10 Sep 2020 12:35          A: As above   P: Pain Control       DVT Prophylaxis      Incentive spirometry      Total hip precautions Reviewed       PT WBAT      Isometric exercises      Discharge Planning      All the above discussed and understood by pt       Ortho to F/U

## 2020-09-10 NOTE — PHYSICAL THERAPY INITIAL EVALUATION ADULT - IMPAIRMENTS FOUND, PT EVAL
joint integrity and mobility/posture/integumentary integrity/ROM/muscle strength/gait, locomotion, and balance

## 2020-09-10 NOTE — PHYSICAL THERAPY INITIAL EVALUATION ADULT - CRITERIA FOR SKILLED THERAPEUTIC INTERVENTIONS
therapy frequency/rehab potential/anticipated discharge recommendation/pending stair negotiation to clarify home with home PT/functional limitations in following categories/predicted duration of therapy intervention/risk reduction/prevention/impairments found

## 2020-09-10 NOTE — PATIENT PROFILE ADULT - NSPROGENSOURCEINFO_GEN_A_NUR
I have reviewed discharge instructions with the patient. The patient verbalized understanding. Patient left ED via Discharge Method: ambulatory to Home with self. Opportunity for questions and clarification provided. Patient given 1 e-scripts. To continue your aftercare when you leave the hospital, you may receive an automated call from our care team to check in on how you are doing. This is a free service and part of our promise to provide the best care and service to meet your aftercare needs.  If you have questions, or wish to unsubscribe from this service please call [phone]. Thank you for Choosing our [facility] Emergency Department. patient

## 2020-09-10 NOTE — PATIENT PROFILE ADULT - NSPROEXTENSIONSOFSELF_GEN_A_NUR
REASON FOR VISIT:  Preoperative clearance.    Preoperative clearance is requested by Dr. Bernardo.    HISTORY OF PRESENT ILLNESS::  Kalpana is a 45 year old female who presents to the clinic for preop clearance for her abdominal hysterectomy. Within the past few months noted mass particularly on right side, had CT and US both which suggest  uterus with multiple fibroids.  Her menses are regular, not unusually heavy or painful.  She is now aware of the fullness. Her u/s did reveal  multiple heterogeneous,well-circumscribed masses in the uterus. One measures 8.9 x 8.2 x 10 cm.  Another one measures 5.8 x 3.7 x 4.7 cm. Multiple smaller masses also seen. She will undergo abdominal hysterectomy. She denies any chest pain, shortness of breath, nausea, vomiting at this time.    Past Medical History:   Diagnosis Date   • Cystic breast    • Hiatal hernia    • Scoliosis    • Uterine fibroid        Past Surgical History:   Procedure Laterality Date   • BIOPSY OF BREAST, INCISIONAL Right     Breast Biopsy, implant placed   •  DELIVERY+POSTPARTUM CARE  ,        • LAPAROSCOPY, CHOLECYSTECTOMY      Cholecystectomy, laparoscopic   • SPINE SURGERY PROCEDURE UNLISTED      Bergeron ABDI PLACEMENT       [unfilled]    Current Outpatient Prescriptions   Medication Sig Dispense Refill   • omeprazole 20 MG tablet Take 20 mg by mouth 2 times daily.        No current facility-administered medications for this visit.        Family History   Problem Relation Age of Onset   • Diabetes Father      Type 2   • Cancer Paternal Aunt      breast   • Cancer Brother      lung       PERSONAL HISTORY:  She does not smoke or drink alcohol.    REVIEW OF SYSTEMS:  General: No fevers, chills, or malaise.  Respiratory: No cough. No hemoptysis.  CVS: No chest pain. No shortness of breath.  GI: No nausea, vomiting, or abdominal pain.  Genitourinary: Negative.  HEME/ONC: Negative.  Psychiatric: Negative.  Neurological:  Negative.  Musculoskeletal: Negative.  HEENT: Negative.  Skin: No rashes.    PHYSICAL EXAMINATION:  General: She is alert and oriented. Appears to be in no acute discomfort.  Vitals: Noted.  HEENT: Normal.  Heart: S1, S2, regular.  Lungs: Clear to auscultation bilaterally.  Abdomen: Soft, non-tender. No palpable masses. Bowel sounds present.  Extremities: No edema.  Neurological: Cranial nerves grossly intact. Motor strength 5/5 in both upper and lower extremities. Reflexes normal.  Pelvic and Rectal: Deferred by the patient.    ASSESSMENT AND PLAN:  Uterine Leiomyoma:She will undergo abdominal hysterectomy.    Her preoperative EKG is within normal limits.  Preoperative labs - CBC. BMP and UA are unremarkable.    I do not see any contraindications to surgery at this time. The patient is cleared for surgery.      Thank you for consultation and please send a copy to .       none

## 2020-09-10 NOTE — DISCHARGE NOTE PROVIDER - HOSPITAL COURSE
35yFemale with history of Left Hip Osteoarthritis presenting for Left AUGUSTUS by Dr. Navarro on 9/10/20. Risk and benefits of surgery were explained to the patient. The patient understood and agreed to proceed with surgery. Patient underwent the procedure with no intraoperative complications. Pt was brought in stable condition to the PACU. Once stable in PACU, pt was brought to the floor. During hospital stay pt was followed by Medicine during this admission. Pt had an uneventful hospital course. Pt is stable for discharge to [rehab/home] on POD#[ ] 35yFemale with history of Left Hip Osteoarthritis presenting for Left AUGUTSUS by Dr. Navarro on 9/10/20. Risk and benefits of surgery were explained to the patient. The patient understood and agreed to proceed with surgery. Patient underwent the procedure with no intraoperative complications. Pt was brought in stable condition to the PACU. Once stable in PACU, pt was brought to the floor. During hospital stay pt was followed by Medicine during this admission. Pt had an uneventful hospital course. Pt is stable for discharge to home on POD#1

## 2020-09-10 NOTE — PHYSICAL THERAPY INITIAL EVALUATION ADULT - ADDITIONAL COMMENTS
Pre-op verified and confirmed: Home set-up: lives with  & children in private house with 3 stair steps with x2 handrails (far apart) to enter at front. Bedroom is at 2nd floor with 12 stair steps to negotiate, x1 left handrail. Bathroom at each floor is a shower/tub combination with grab rails, standard height toilet seats, with fixed + retractable shower heads. Endorses will be supported by post-op by . Patient is currently independent with mobility.

## 2020-09-10 NOTE — DISCHARGE NOTE PROVIDER - CARE PROVIDER_API CALL
Riley Navarro  ORTHOPAEDIC SURGERY  [address] [place] [address]  Phone: [phone]  Fax: [phone]  Follow Up Time:

## 2020-09-10 NOTE — DISCHARGE NOTE PROVIDER - NSDCMRMEDTOKEN_GEN_ALL_CORE_FT
EPINEPHrine 0.3 mg injectable kit: 0.3 milligram(s) intramuscularly once as needed for anaphylaxis only   mg oral tablet: 1 tab(s) orally 2 times a day, As Needed acetaminophen 325 mg oral tablet: 2 tab(s) orally every 6 hours  ascorbic acid 500 mg oral tablet: 1 tab(s) orally 2 times a day  aspirin 81 mg oral delayed release tablet: 1 tab(s) orally 2 times a day MDD:2  celecoxib 200 mg oral capsule: 1 cap(s) orally 2 times a day MDD:2  magnesium hydroxide 8% oral suspension: 30 milliliter(s) orally once a day, As needed, Constipation  Multiple Vitamins oral tablet: 1 tab(s) orally once a day  oxyCODONE 10 mg oral tablet: 1 tab(s) orally every 4 hours, As needed, pain 6-10 MDD:10  pantoprazole 40 mg oral delayed release tablet: 1 tab(s) orally once a day (before a meal) MDD:1  polyethylene glycol 3350 oral powder for reconstitution: 17 gram(s) orally once a day  senna oral tablet: 2 tab(s) orally once a day (at bedtime), As needed, Constipation

## 2020-09-10 NOTE — PHYSICAL THERAPY INITIAL EVALUATION ADULT - RANGE OF MOTION EXAMINATION, REHAB EVAL
deficits as listed below/Right LE ROM was WFL (within functional limits)/L hip posterior dislocation: 90 degrees flexion, no ADD pass midline, no IR

## 2020-09-10 NOTE — OCCUPATIONAL THERAPY INITIAL EVALUATION ADULT - PLANNED THERAPY INTERVENTIONS, OT EVAL
transfer training/ROM/strengthening/stretching/ADL retraining/balance training/bed mobility training

## 2020-09-10 NOTE — DISCHARGE NOTE PROVIDER - NSDCFUADDINST_GEN_ALL_CORE_FT
Keep Prineo Dressing Clean, Dry and Intact. May shower with Prineo Dressing. Please do not scrub, soak, peel or pick at the prineo dressing. No creams, lotions, or oils over dressing. May shower and let water run over incision, no baths. Pat dry once out of shower. Dressing to be removed in office at follow up visit in 2 weeks. Keep Prineo Dressing Clean, Dry and Intact. May shower with Prineo Dressing. Please do not scrub, soak, peel or pick at the prineo dressing. No creams, lotions, or oils over dressing. May shower and let water run over incision, no baths. Pat dry once out of shower. Dressing to be removed in office at follow up visit in 2 weeks.    Hip replacement precautions: Abduction pillow. Elevate the leg (while keeping hip precautions) as often as possible to help control swelling. Incentive spirometer 10X/hr.

## 2020-09-10 NOTE — DISCHARGE NOTE PROVIDER - NSDCFUADDAPPT_GEN_ALL_CORE_FT
Follow up with your surgeon in two weeks. Call for appointment.  If you need more pain medication, call your surgeon's office.  We Recommend a follow up appointment with your primary care physician for repeat blood work (CBC and BMP) for post hospital discharge follow-up care.  Call your surgeon if you have increased redness/pain/drainage or fever. Return to ER for shortness of breath/calf tenderness. Follow up with your surgeon in two weeks. Call for appointment.  If you need more pain medication, call your surgeon's office.  We Recommend a follow up appointment with your primary care physician for repeat blood work (CBC and BMP) for post hospital discharge follow-up care.  Call your surgeon if you have increased redness/pain/drainage or fever. Return to ER for shortness of breath/calf tenderness.    Per Dr. Navarro: may advance from walker as tolerated per discretion of physical therapist.

## 2020-09-10 NOTE — OCCUPATIONAL THERAPY INITIAL EVALUATION ADULT - ADDITIONAL COMMENTS
Pre op assessment- pt lives with  & children in private house with 3 stair steps with x2 handrails (far apart) to enter at front. Bedroom is at 2nd floor with 12 stair steps to negotiate, x1 left handrail. Bathroom at each floor is a shower/tub combination with grab rails, standard height toilet seats, with fixed + retractable shower heads. Endorses will be supported by post-op by .

## 2020-09-11 ENCOUNTER — TRANSCRIPTION ENCOUNTER (OUTPATIENT)
Age: 35
End: 2020-09-11

## 2020-09-11 VITALS
HEART RATE: 60 BPM | DIASTOLIC BLOOD PRESSURE: 70 MMHG | RESPIRATION RATE: 16 BRPM | SYSTOLIC BLOOD PRESSURE: 126 MMHG | OXYGEN SATURATION: 99 % | TEMPERATURE: 99 F

## 2020-09-11 LAB
ANION GAP SERPL CALC-SCNC: 7 MMOL/L — SIGNIFICANT CHANGE UP (ref 5–17)
BUN SERPL-MCNC: 4 MG/DL — LOW (ref 7–23)
CALCIUM SERPL-MCNC: 8.2 MG/DL — LOW (ref 8.5–10.1)
CHLORIDE SERPL-SCNC: 105 MMOL/L — SIGNIFICANT CHANGE UP (ref 96–108)
CO2 SERPL-SCNC: 27 MMOL/L — SIGNIFICANT CHANGE UP (ref 22–31)
CREAT SERPL-MCNC: 0.56 MG/DL — SIGNIFICANT CHANGE UP (ref 0.5–1.3)
GLUCOSE SERPL-MCNC: 135 MG/DL — HIGH (ref 70–99)
HCT VFR BLD CALC: 24.1 % — LOW (ref 34.5–45)
HGB BLD-MCNC: 8.5 G/DL — LOW (ref 11.5–15.5)
MCHC RBC-ENTMCNC: 23.4 PG — LOW (ref 27–34)
MCHC RBC-ENTMCNC: 35.3 GM/DL — SIGNIFICANT CHANGE UP (ref 32–36)
MCV RBC AUTO: 66.2 FL — LOW (ref 80–100)
NRBC # BLD: 0 /100 WBCS — SIGNIFICANT CHANGE UP (ref 0–0)
PLATELET # BLD AUTO: 166 K/UL — SIGNIFICANT CHANGE UP (ref 150–400)
POTASSIUM SERPL-MCNC: 3.9 MMOL/L — SIGNIFICANT CHANGE UP (ref 3.5–5.3)
POTASSIUM SERPL-SCNC: 3.9 MMOL/L — SIGNIFICANT CHANGE UP (ref 3.5–5.3)
RBC # BLD: 3.64 M/UL — LOW (ref 3.8–5.2)
RBC # FLD: 15.3 % — HIGH (ref 10.3–14.5)
SODIUM SERPL-SCNC: 139 MMOL/L — SIGNIFICANT CHANGE UP (ref 135–145)
WBC # BLD: 13.86 K/UL — HIGH (ref 3.8–10.5)
WBC # FLD AUTO: 13.86 K/UL — HIGH (ref 3.8–10.5)

## 2020-09-11 RX ORDER — MAGNESIUM HYDROXIDE 400 MG/1
30 TABLET, CHEWABLE ORAL
Qty: 0 | Refills: 0 | DISCHARGE
Start: 2020-09-11

## 2020-09-11 RX ORDER — PANTOPRAZOLE SODIUM 20 MG/1
1 TABLET, DELAYED RELEASE ORAL
Qty: 30 | Refills: 0
Start: 2020-09-11 | End: 2020-10-10

## 2020-09-11 RX ORDER — ASPIRIN/CALCIUM CARB/MAGNESIUM 324 MG
1 TABLET ORAL
Qty: 60 | Refills: 0
Start: 2020-09-11 | End: 2020-10-10

## 2020-09-11 RX ORDER — CELECOXIB 200 MG/1
1 CAPSULE ORAL
Qty: 60 | Refills: 0
Start: 2020-09-11 | End: 2020-10-10

## 2020-09-11 RX ORDER — POLYETHYLENE GLYCOL 3350 17 G/17G
17 POWDER, FOR SOLUTION ORAL
Qty: 0 | Refills: 0 | DISCHARGE
Start: 2020-09-11

## 2020-09-11 RX ORDER — KETOROLAC TROMETHAMINE 30 MG/ML
30 SYRINGE (ML) INJECTION ONCE
Refills: 0 | Status: DISCONTINUED | OUTPATIENT
Start: 2020-09-11 | End: 2020-09-11

## 2020-09-11 RX ORDER — ACETAMINOPHEN 500 MG
2 TABLET ORAL
Qty: 0 | Refills: 0 | DISCHARGE
Start: 2020-09-11

## 2020-09-11 RX ORDER — ASCORBIC ACID 60 MG
1 TABLET,CHEWABLE ORAL
Qty: 0 | Refills: 0 | DISCHARGE
Start: 2020-09-11

## 2020-09-11 RX ORDER — SENNA PLUS 8.6 MG/1
2 TABLET ORAL
Qty: 0 | Refills: 0 | DISCHARGE
Start: 2020-09-11

## 2020-09-11 RX ORDER — OXYCODONE HYDROCHLORIDE 5 MG/1
1 TABLET ORAL
Qty: 42 | Refills: 0
Start: 2020-09-11 | End: 2020-09-17

## 2020-09-11 RX ADMIN — Medication 30 MILLIGRAM(S): at 09:13

## 2020-09-11 RX ADMIN — CELECOXIB 200 MILLIGRAM(S): 200 CAPSULE ORAL at 05:34

## 2020-09-11 RX ADMIN — HYDROMORPHONE HYDROCHLORIDE 0.5 MILLIGRAM(S): 2 INJECTION INTRAMUSCULAR; INTRAVENOUS; SUBCUTANEOUS at 03:30

## 2020-09-11 RX ADMIN — OXYCODONE HYDROCHLORIDE 5 MILLIGRAM(S): 5 TABLET ORAL at 01:52

## 2020-09-11 RX ADMIN — Medication 650 MILLIGRAM(S): at 11:23

## 2020-09-11 RX ADMIN — OXYCODONE HYDROCHLORIDE 10 MILLIGRAM(S): 5 TABLET ORAL at 11:24

## 2020-09-11 RX ADMIN — Medication 102 MILLIGRAM(S): at 05:38

## 2020-09-11 RX ADMIN — OXYCODONE HYDROCHLORIDE 5 MILLIGRAM(S): 5 TABLET ORAL at 05:34

## 2020-09-11 RX ADMIN — Medication 500 MILLIGRAM(S): at 05:39

## 2020-09-11 RX ADMIN — HYDROMORPHONE HYDROCHLORIDE 0.5 MILLIGRAM(S): 2 INJECTION INTRAMUSCULAR; INTRAVENOUS; SUBCUTANEOUS at 03:09

## 2020-09-11 RX ADMIN — CELECOXIB 200 MILLIGRAM(S): 200 CAPSULE ORAL at 06:35

## 2020-09-11 RX ADMIN — PANTOPRAZOLE SODIUM 40 MILLIGRAM(S): 20 TABLET, DELAYED RELEASE ORAL at 07:58

## 2020-09-11 RX ADMIN — Medication 1 TABLET(S): at 11:23

## 2020-09-11 RX ADMIN — Medication 100 MILLIGRAM(S): at 00:54

## 2020-09-11 RX ADMIN — Medication 400 MILLIGRAM(S): at 05:23

## 2020-09-11 RX ADMIN — OXYCODONE HYDROCHLORIDE 5 MILLIGRAM(S): 5 TABLET ORAL at 02:31

## 2020-09-11 RX ADMIN — OXYCODONE HYDROCHLORIDE 5 MILLIGRAM(S): 5 TABLET ORAL at 06:35

## 2020-09-11 RX ADMIN — OXYCODONE HYDROCHLORIDE 10 MILLIGRAM(S): 5 TABLET ORAL at 00:30

## 2020-09-11 RX ADMIN — Medication 81 MILLIGRAM(S): at 05:34

## 2020-09-11 NOTE — PROGRESS NOTE ADULT - SUBJECTIVE AND OBJECTIVE BOX
LARISSA EDWARDS is a 35y Female s/p LEFT TOTAL HIP ARTHROPLASTY  by Dr. Navarro on 9/10/20; complains of postop pain; patient tolerated surgery well.    ROS: no pulmonary, cardiovascular, gastrointestinal, urological or neurological symptoms.     PHYS: T(C): 36.7 (09-11-20 @ 05:31), Max: 36.9 (09-10-20 @ 20:54)  HR: 56 (09-11-20 @ 05:31) (53 - 141)  BP: 134/75 (09-11-20 @ 05:31) (74/34 - 140/84)  RR: 18 (09-11-20 @ 05:31) (15 - 26)  SpO2: 99% (09-11-20 @ 05:31) (95% - 100%)  vss; lungs, clear; heart, reg rhythm, no murmurs, rubs or gallops;   abd, soft, non tender, normal bowel sounds, no calf tenderness or edema                         9.6    11.44 )-----------( 172      ( 10 Sep 2020 12:35 )             26.4   09-10    140  |  107  |  9   ----------------------------<  103<H>  3.8   |  27  |  0.55    Ca    8.2<L>      10 Sep 2020 12:35    Assessment and Plan: status post left total hip replacement; postop anemia (due to acute blood loss); postop leucocytosis; random elevated glucose; hypocalcemia; post op hypotension; pain control; deep vein thrombophlebitis prophylaxis; physical therapy; bowel regimen; nutrition support; follow up labs; will follow.

## 2020-09-11 NOTE — DISCHARGE NOTE NURSING/CASE MANAGEMENT/SOCIAL WORK - PATIENT PORTAL LINK FT
You can access the FollowMyHealth Patient Portal offered by [facility] by registering at the following website: [email or web address]. By joining [facility]’s FollowMyHealth portal, you will also be able to view your health information using other applications (apps) compatible with our system.

## 2020-09-11 NOTE — PROGRESS NOTE ADULT - SUBJECTIVE AND OBJECTIVE BOX
Patient is seen and examined at bedside. Denies CP/SOB/Dizziness/N/V/D/HA. Pain is not controlled.     Vital Signs Last 24 Hrs  T(C): 36.8 (11 Sep 2020 09:30), Max: 36.9 (10 Sep 2020 20:54)  T(F): 98.3 (11 Sep 2020 09:30), Max: 98.4 (10 Sep 2020 20:54)  HR: 59 (11 Sep 2020 09:30) (53 - 141)  BP: 117/56 (11 Sep 2020 09:30) (74/34 - 140/84)  BP(mean): 97 (10 Sep 2020 13:28) (50 - 97)  RR: 18 (11 Sep 2020 09:30) (15 - 26)  SpO2: 99% (11 Sep 2020 09:30) (95% - 100%)    GEN: NAD  ABD: soft, NT/ND; no rebound or guarding;  Neurologic: AAOx3; CNS grossly intact; no focal deficits  RLE: Motor intact + EHL/FHL/TA/GS. Sensation is grossly intact.  Extremities warm. . Compartments soft, compressible. No calf tenderness. DP 2+.  LLE: Prineo dressing C/D/I.  Motor intact + EHL/FHL/TA/GS. Sensation is grossly intact. Extremities warm. Compartments soft, compressible. No calf tenderness.. DP 2+.    Labs:                          8.5    13.86 )-----------( 166      ( 11 Sep 2020 07:30 )             24.1       09-11    139  |  105  |  4<L>  ----------------------------<  135<H>  3.9   |  27  |  0.56    Ca    8.2<L>      11 Sep 2020 07:30        A/P: Patient is a 35y y/o Female s/p L AUGUSTUS, POD # 1  -wound care, isometric exercises, GI motility, new medications, hip precautions,  hospital course and discharge planning reviewed with pt  -Pain control/analgesia: Toradol X 1. Monitor oxy 10mg effectiveness. Consider oxy 15mg .  -Inc spirometry reviewed and counseled  -Venodynes/foot pumps  -PT/OT/WBAT  -Anticoagulation: asa 81 mg BID  -medical consult reviewed  -DC plan: home today

## 2020-09-13 RX ORDER — OXYCODONE HYDROCHLORIDE 5 MG/1
1 TABLET ORAL
Qty: 30 | Refills: 0
Start: 2020-09-13 | End: 2020-09-17

## 2020-09-14 LAB — SURGICAL PATHOLOGY STUDY: SIGNIFICANT CHANGE UP

## 2020-09-15 DIAGNOSIS — E83.51 HYPOCALCEMIA: ICD-10-CM

## 2020-09-15 DIAGNOSIS — M24.152 OTHER ARTICULAR CARTILAGE DISORDERS, LEFT HIP: ICD-10-CM

## 2020-09-15 DIAGNOSIS — I95.81 POSTPROCEDURAL HYPOTENSION: ICD-10-CM

## 2020-09-15 DIAGNOSIS — D62 ACUTE POSTHEMORRHAGIC ANEMIA: ICD-10-CM

## 2020-09-15 DIAGNOSIS — M16.12 UNILATERAL PRIMARY OSTEOARTHRITIS, LEFT HIP: ICD-10-CM

## 2020-09-15 DIAGNOSIS — D72.828 OTHER ELEVATED WHITE BLOOD CELL COUNT: ICD-10-CM

## 2020-09-15 DIAGNOSIS — M87.9 OSTEONECROSIS, UNSPECIFIED: ICD-10-CM

## 2020-09-23 ENCOUNTER — EMERGENCY (EMERGENCY)
Facility: HOSPITAL | Age: 35
LOS: 0 days | Discharge: HOME | End: 2020-09-23
Attending: STUDENT IN AN ORGANIZED HEALTH CARE EDUCATION/TRAINING PROGRAM
Payer: COMMERCIAL

## 2020-09-23 VITALS
RESPIRATION RATE: 20 BRPM | HEART RATE: 70 BPM | DIASTOLIC BLOOD PRESSURE: 58 MMHG | SYSTOLIC BLOOD PRESSURE: 120 MMHG | OXYGEN SATURATION: 100 % | TEMPERATURE: 98 F

## 2020-09-23 VITALS
HEART RATE: 79 BPM | SYSTOLIC BLOOD PRESSURE: 120 MMHG | DIASTOLIC BLOOD PRESSURE: 77 MMHG | TEMPERATURE: 98 F | OXYGEN SATURATION: 99 % | WEIGHT: 145.06 LBS | HEIGHT: 63 IN | RESPIRATION RATE: 20 BRPM

## 2020-09-23 DIAGNOSIS — T84.021A DISLOCATION OF INTERNAL LEFT HIP PROSTHESIS, INITIAL ENCOUNTER: ICD-10-CM

## 2020-09-23 DIAGNOSIS — Z98.891 HISTORY OF UTERINE SCAR FROM PREVIOUS SURGERY: Chronic | ICD-10-CM
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
## 2020-09-23 DIAGNOSIS — M25.552 PAIN IN LEFT HIP: ICD-10-CM
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
## 2020-09-23 LAB
ADD ON TEST-SPECIMEN IN LAB: SIGNIFICANT CHANGE UP
ALBUMIN SERPL ELPH-MCNC: 3.6 G/DL — SIGNIFICANT CHANGE UP (ref 3.3–5)
ALP SERPL-CCNC: 53 U/L — SIGNIFICANT CHANGE UP (ref 40–120)
ALT FLD-CCNC: 13 U/L — SIGNIFICANT CHANGE UP (ref 12–78)
ANION GAP SERPL CALC-SCNC: 5 MMOL/L — SIGNIFICANT CHANGE UP (ref 5–17)
ANISOCYTOSIS BLD QL: SIGNIFICANT CHANGE UP
APTT BLD: 30.9 SEC — SIGNIFICANT CHANGE UP (ref 27.5–35.5)
AST SERPL-CCNC: 17 U/L — SIGNIFICANT CHANGE UP (ref 15–37)
BASOPHILS # BLD AUTO: 0.05 K/UL — SIGNIFICANT CHANGE UP (ref 0–0.2)
BASOPHILS NFR BLD AUTO: 0.4 % — SIGNIFICANT CHANGE UP (ref 0–2)
BILIRUB SERPL-MCNC: 0.8 MG/DL — SIGNIFICANT CHANGE UP (ref 0.2–1.2)
BLD GP AB SCN SERPL QL: SIGNIFICANT CHANGE UP
BUN SERPL-MCNC: 10 MG/DL — SIGNIFICANT CHANGE UP (ref 7–23)
CALCIUM SERPL-MCNC: 8.9 MG/DL — SIGNIFICANT CHANGE UP (ref 8.5–10.1)
CHLORIDE SERPL-SCNC: 107 MMOL/L — SIGNIFICANT CHANGE UP (ref 96–108)
CO2 SERPL-SCNC: 27 MMOL/L — SIGNIFICANT CHANGE UP (ref 22–31)
CREAT SERPL-MCNC: 0.55 MG/DL — SIGNIFICANT CHANGE UP (ref 0.5–1.3)
EOSINOPHIL # BLD AUTO: 0.09 K/UL — SIGNIFICANT CHANGE UP (ref 0–0.5)
EOSINOPHIL NFR BLD AUTO: 0.8 % — SIGNIFICANT CHANGE UP (ref 0–6)
ERYTHROCYTE [SEDIMENTATION RATE] IN BLOOD: 8 MM/HR — SIGNIFICANT CHANGE UP (ref 0–15)
GLUCOSE SERPL-MCNC: 88 MG/DL — SIGNIFICANT CHANGE UP (ref 70–99)
HCG SERPL-ACNC: <1 MIU/ML — SIGNIFICANT CHANGE UP
HCT VFR BLD CALC: 24.6 % — LOW (ref 34.5–45)
HGB BLD-MCNC: 8.9 G/DL — LOW (ref 11.5–15.5)
HYPOCHROMIA BLD QL: SIGNIFICANT CHANGE UP
IMM GRANULOCYTES NFR BLD AUTO: 0.9 % — SIGNIFICANT CHANGE UP (ref 0–1.5)
INR BLD: 1.02 RATIO — SIGNIFICANT CHANGE UP (ref 0.88–1.16)
LYMPHOCYTES # BLD AUTO: 1.12 K/UL — SIGNIFICANT CHANGE UP (ref 1–3.3)
LYMPHOCYTES # BLD AUTO: 9.9 % — LOW (ref 13–44)
MANUAL SMEAR VERIFICATION: SIGNIFICANT CHANGE UP
MCHC RBC-ENTMCNC: 23.2 PG — LOW (ref 27–34)
MCHC RBC-ENTMCNC: 36.2 GM/DL — HIGH (ref 32–36)
MCV RBC AUTO: 64.1 FL — LOW (ref 80–100)
MICROCYTES BLD QL: SIGNIFICANT CHANGE UP
MONOCYTES # BLD AUTO: 0.58 K/UL — SIGNIFICANT CHANGE UP (ref 0–0.9)
MONOCYTES NFR BLD AUTO: 5.1 % — SIGNIFICANT CHANGE UP (ref 2–14)
NEUTROPHILS # BLD AUTO: 9.33 K/UL — HIGH (ref 1.8–7.4)
NEUTROPHILS NFR BLD AUTO: 82.9 % — HIGH (ref 43–77)
PLAT MORPH BLD: NORMAL — SIGNIFICANT CHANGE UP
PLATELET # BLD AUTO: 334 K/UL — SIGNIFICANT CHANGE UP (ref 150–400)
POIKILOCYTOSIS BLD QL AUTO: SLIGHT — SIGNIFICANT CHANGE UP
POLYCHROMASIA BLD QL SMEAR: SLIGHT — SIGNIFICANT CHANGE UP
POTASSIUM SERPL-MCNC: 3.8 MMOL/L — SIGNIFICANT CHANGE UP (ref 3.5–5.3)
POTASSIUM SERPL-SCNC: 3.8 MMOL/L — SIGNIFICANT CHANGE UP (ref 3.5–5.3)
PROT SERPL-MCNC: 6.9 GM/DL — SIGNIFICANT CHANGE UP (ref 6–8.3)
PROTHROM AB SERPL-ACNC: 11.8 SEC — SIGNIFICANT CHANGE UP (ref 10.6–13.6)
RBC # BLD: 3.84 M/UL — SIGNIFICANT CHANGE UP (ref 3.8–5.2)
RBC # FLD: 15.9 % — HIGH (ref 10.3–14.5)
RBC BLD AUTO: ABNORMAL
SCHISTOCYTES BLD QL AUTO: SLIGHT — SIGNIFICANT CHANGE UP
SODIUM SERPL-SCNC: 139 MMOL/L — SIGNIFICANT CHANGE UP (ref 135–145)
STOMATOCYTES BLD QL SMEAR: SLIGHT — SIGNIFICANT CHANGE UP
TARGETS BLD QL SMEAR: SIGNIFICANT CHANGE UP
WBC # BLD: 11.27 K/UL — HIGH (ref 3.8–10.5)
WBC # FLD AUTO: 11.27 K/UL — HIGH (ref 3.8–10.5)

## 2020-09-23 PROCEDURE — 73502 X-RAY EXAM HIP UNI 2-3 VIEWS: CPT | Mod: LT

## 2020-09-23 PROCEDURE — 86850 RBC ANTIBODY SCREEN: CPT

## 2020-09-23 PROCEDURE — 27265 TREAT HIP DISLOCATION: CPT

## 2020-09-23 PROCEDURE — 85652 RBC SED RATE AUTOMATED: CPT

## 2020-09-23 PROCEDURE — 85610 PROTHROMBIN TIME: CPT

## 2020-09-23 PROCEDURE — 99156 MOD SED OTH PHYS/QHP 5/>YRS: CPT

## 2020-09-23 PROCEDURE — 96375 TX/PRO/DX INJ NEW DRUG ADDON: CPT | Mod: XU

## 2020-09-23 PROCEDURE — 36415 COLL VENOUS BLD VENIPUNCTURE: CPT

## 2020-09-23 PROCEDURE — 86140 C-REACTIVE PROTEIN: CPT

## 2020-09-23 PROCEDURE — 27265 TREAT HIP DISLOCATION: CPT | Mod: LT

## 2020-09-23 PROCEDURE — 80053 COMPREHEN METABOLIC PANEL: CPT

## 2020-09-23 PROCEDURE — 86900 BLOOD TYPING SEROLOGIC ABO: CPT

## 2020-09-23 PROCEDURE — 85025 COMPLETE CBC W/AUTO DIFF WBC: CPT

## 2020-09-23 PROCEDURE — 73502 X-RAY EXAM HIP UNI 2-3 VIEWS: CPT | Mod: 26,LT

## 2020-09-23 PROCEDURE — 96376 TX/PRO/DX INJ SAME DRUG ADON: CPT | Mod: XU

## 2020-09-23 PROCEDURE — 84702 CHORIONIC GONADOTROPIN TEST: CPT

## 2020-09-23 PROCEDURE — 86901 BLOOD TYPING SEROLOGIC RH(D): CPT

## 2020-09-23 PROCEDURE — 99285 EMERGENCY DEPT VISIT HI MDM: CPT

## 2020-09-23 PROCEDURE — 85730 THROMBOPLASTIN TIME PARTIAL: CPT

## 2020-09-23 PROCEDURE — 96374 THER/PROPH/DIAG INJ IV PUSH: CPT | Mod: XU

## 2020-09-23 PROCEDURE — 99284 EMERGENCY DEPT VISIT MOD MDM: CPT | Mod: 25

## 2020-09-23 RX ORDER — KETAMINE HYDROCHLORIDE 100 MG/ML
100 INJECTION INTRAMUSCULAR; INTRAVENOUS ONCE
Refills: 0 | Status: DISCONTINUED | OUTPATIENT
Start: 2020-09-23 | End: 2020-09-23

## 2020-09-23 RX ORDER — FENTANYL CITRATE 50 UG/ML
50 INJECTION INTRAVENOUS ONCE
Refills: 0 | Status: DISCONTINUED | OUTPATIENT
Start: 2020-09-23 | End: 2020-09-23

## 2020-09-23 RX ORDER — SODIUM CHLORIDE 9 MG/ML
1000 INJECTION INTRAMUSCULAR; INTRAVENOUS; SUBCUTANEOUS ONCE
Refills: 0 | Status: COMPLETED | OUTPATIENT
Start: 2020-09-23 | End: 2020-09-23

## 2020-09-23 RX ORDER — OXYCODONE AND ACETAMINOPHEN 5; 325 MG/1; MG/1
2 TABLET ORAL ONCE
Refills: 0 | Status: COMPLETED | OUTPATIENT
Start: 2020-09-23 | End: 2020-09-23

## 2020-09-23 RX ORDER — KETAMINE HYDROCHLORIDE 100 MG/ML
80 INJECTION INTRAMUSCULAR; INTRAVENOUS ONCE
Refills: 0 | Status: DISCONTINUED | OUTPATIENT
Start: 2020-09-23 | End: 2020-09-23

## 2020-09-23 RX ORDER — ONDANSETRON 8 MG/1
4 TABLET, FILM COATED ORAL ONCE
Refills: 0 | Status: COMPLETED | OUTPATIENT
Start: 2020-09-23 | End: 2020-09-23

## 2020-09-23 RX ORDER — MORPHINE SULFATE 50 MG/1
4 CAPSULE, EXTENDED RELEASE ORAL ONCE
Refills: 0 | Status: DISCONTINUED | OUTPATIENT
Start: 2020-09-23 | End: 2020-09-23

## 2020-09-23 RX ADMIN — ONDANSETRON 4 MILLIGRAM(S): 8 TABLET, FILM COATED ORAL at 13:05

## 2020-09-23 RX ADMIN — SODIUM CHLORIDE 2000 MILLILITER(S): 9 INJECTION INTRAMUSCULAR; INTRAVENOUS; SUBCUTANEOUS at 13:05

## 2020-09-23 RX ADMIN — SODIUM CHLORIDE 1000 MILLILITER(S): 9 INJECTION INTRAMUSCULAR; INTRAVENOUS; SUBCUTANEOUS at 15:53

## 2020-09-23 RX ADMIN — FENTANYL CITRATE 50 MICROGRAM(S): 50 INJECTION INTRAVENOUS at 13:48

## 2020-09-23 RX ADMIN — FENTANYL CITRATE 50 MICROGRAM(S): 50 INJECTION INTRAVENOUS at 14:33

## 2020-09-23 RX ADMIN — MORPHINE SULFATE 4 MILLIGRAM(S): 50 CAPSULE, EXTENDED RELEASE ORAL at 13:05

## 2020-09-23 RX ADMIN — KETAMINE HYDROCHLORIDE 80 MILLIGRAM(S): 100 INJECTION INTRAMUSCULAR; INTRAVENOUS at 15:28

## 2020-09-23 NOTE — ED PROVIDER NOTE - PROGRESS NOTE DETAILS
reduction done by ortho under sedation. can follow up with Dr. Navarro. patient fully awake and aware.  will come to  patient.

## 2020-09-23 NOTE — CONSULT NOTE ADULT - ASSESSMENT
A/P: 35F w/ L TKA dislocation s/p L AUGUSTUS 9/10/20.  Recommend follow up with Dr Navarro in office within 1 week.   Analgesia prn  WBAT with knee immobilizer and abduction pillow while in bed and seated  DVT ppx if decreased ambulation for extended period of time.   PT/OT  Ice as tolerated  No acute orthopedic surgical intervention indicated at this time  Orthopedically stable  Discussed with Dr Salinas who is in agreement with above plan.

## 2020-09-23 NOTE — ED ADULT TRIAGE NOTE - CHIEF COMPLAINT QUOTE
pt BIBA for possible left hip dislocation while rolling over in bed.  Pt has had a recent left hip surgery for chronic fx and ligament injury on 9/10/20.  The left leg is internally rotated and shortened.

## 2020-09-23 NOTE — ED PROVIDER NOTE - NS ED ROS FT
Constitutional: No fever.  Eyes: No vision changes.   Ears, Nose, Mouth, Throat: No sore throat.  Cardiovascular: No chest pain.  Respiratory: No difficulty breathing.  Gastrointestinal: No nausea or vomiting.  Genitourinary: No dysuria.  Musculoskeletal: No joint pain. +pain to right hip  Integumentary (skin and/or breast): No rash.  Neurological: No headache.  Psychiatric: No depression.  Endocrine:  No heat / cold intolerance.  Hematologic/Lymphatic: No easy bruising   Allergic/Immunologic:  No current allergic reactions. Constitutional: No fever.  Eyes: No vision changes.   Ears, Nose, Mouth, Throat: No sore throat.  Cardiovascular: No chest pain.  Respiratory: No difficulty breathing.  Gastrointestinal: No nausea or vomiting.  Genitourinary: No dysuria.  Musculoskeletal: No joint pain. +pain to left hip  Integumentary (skin and/or breast): No rash.  Neurological: No headache.  Psychiatric: No depression.  Endocrine:  No heat / cold intolerance.  Hematologic/Lymphatic: No easy bruising   Allergic/Immunologic:  No current allergic reactions.

## 2020-09-23 NOTE — ED PROVIDER NOTE - PHYSICAL EXAMINATION
GENERALIZED APPEARANCE:  Comfortable, appear in pain  SKIN:  Warm and dry. +well healing surgical wound over lateral left hip  HEAD:  Normocephalic.  EYES:  Conjunctiva pink, no icterus.  ENMT:  Mucus membranes moist.  CHEST AND RESPIRATORY:  Clear to auscultation with good air entry bilaterally.  HEART AND CARDIOVASCULAR:  Regular rate, no obvious murmur.  ABDOMEN AND GI:  Soft, non-tender, non-distended.  No rebound, no guarding.  EXTREMITIES:  No deformity, edema, or calf tenderness. +left hip TTP, left leg shortened, minimally internally rotated, palpable DP, sensation intact  NEURO: AAOx3, gross motor and sensory intact.  PSYCH: Normal affect.

## 2020-09-23 NOTE — CONSULT NOTE ADULT - SUBJECTIVE AND OBJECTIVE BOX
Patient is a 35yFemale community without assistive devices who presents to [facility] ED w/ a c/o of L hip pain. Patient states that she was sitting on the bed and admits to moving her leg into a position that cause the hip to dislocate. She was unable to move the leg after the hip dislocaton. She had her AUGUSTUS done w/ Dr Navarro at [facility] on 9/10/20. States inability to walk immediately following the injury. Denies any numbness or tingling. Denies having any other pain elsewhere.  No other orthopedic concerns at this time.    No pertinent past medical history            No Known Allergies      PHYSICAL EXAM:  T(C): 36.8 (09-23-20 @ 16:55), Max: 36.8 (09-23-20 @ 12:41)  HR: 70 (09-23-20 @ 16:55) (70 - 79)  BP: 120/58 (09-23-20 @ 16:55) (120/58 - 120/77)  RR: 20 (09-23-20 @ 16:55) (20 - 20)  SpO2: 100% (09-23-20 @ 16:55) (99% - 100%)    Gen: NAD, Resting comfortably  LEFT Lower Extremity:   Skin intact, leg shortened, mildly internally rotated.   NTTP over the bony prominences of the hip/knee/ankle/foot/toes  Painless passive/active ROM of the hip/knee/ankle/foot  L2-S1 SILT  Motor grossly intact throughout TA/EHL/FHL/GSC  + DP  Compartments soft and compressible  Calf nontender      Secondary Survey:   No TTP over bony prominences, SILT, palpable pulses, full/painless A/PROM, compartments soft. No TTP over spinous processes or paraspinal muscles at C/T/L spine. No palpable step off. No other injuries or complaints.      XR L Hip: Posterior dislocation of the Left hip  Post Reductions: Reduced AUGUSTUS, femoral component head well position in the cup.    Procedure:  Consent for closed reduction of L hip dislocation was obtained. The patient was adequately sedated by the ED team and was reduced in the ED. Patient tolerated the procedure well. No complications.

## 2020-09-23 NOTE — ED PROVIDER NOTE - NSFOLLOWUPINSTRUCTIONS_ED_ALL_ED_FT
ED evaluation and management discussed with the patient and family (if available) in detail.  Close PMD follow up encouraged.  Strict ED return instructions discussed in detail and patient given the opportunity to ask any questions about their discharge diagnosis and instructions. Patient verbalized understanding.     Dislocation    A dislocation is a displacement of the bones that form a joint. This can result from trauma or due to a previous weakening of that joint. Symptoms include pain, swelling, and deformity at the site. Your health care provider has performed maneuvers to place the bones back in place. If a splint or brace was applied, make sure to wear it until you see an orthopedist as instructed.     SEEK IMMEDIATE MEDICAL CARE IF YOU HAVE ANY OF THE FOLLOWING SYMPTOMS: numbness, tingling, pain, weakness, or skin color/temperature change in any part of your body distal to the injury.

## 2020-09-23 NOTE — ED PROVIDER NOTE - OBJECTIVE STATEMENT
34 y/o female with no pertinent PMHx presents to the ED BIBA for possible hip dislocation. States she rolled over in bed at 11AM, and felt her hip pop. c/o pain to right hip. Pt is s/p total hip arthoplasty 09/10/2020 with Dr. Navarro for chronic fracture and ligament injury. Not on any blood thinners.  Denies fevers, chills, nausea, vomiting, SOB. 36 y/o female with no pertinent PMHx presents to the ED BIBA for possible hip dislocation. States she rolled over in bed at 11AM, and felt her hip pop. c/o pain to left hip. Pt is s/p total hip arthoplasty 09/10/2020 with Dr. Navarro for chronic fracture and ligament injury. Not on any blood thinners.  Denies fevers, chills, nausea, vomiting, SOB.

## 2020-09-23 NOTE — ED PROVIDER NOTE - NS_ ATTENDINGSCRIBEDETAILS _ED_A_ED_FT
I, Antonio Hernandez DO,  performed the initial face to face bedside interview with this patient regarding history of present illness, review of symptoms and relevant past medical, social and family history. I completed an independent physical examination. I was the initial provider who evaluated this patient.    The history, relevant review of systems, past medical and surgical history, medical decision making, and physical examination was documented by the scribe in my presence and I attest to the accuracy of the documentation.

## 2020-09-23 NOTE — ED ADULT NURSE NOTE - OBJECTIVE STATEMENT
pt had left hip replacement 17 days pta at another facility. Pt was laying face down in bed and went to turn over, dislocating her left hip.

## 2020-09-23 NOTE — PROCEDURAL SAFETY CHECKLIST WITH OR WITHOUT SEDATION - NSPOSTCOMMENTFT_GEN_ALL_CORE
Pt tolerated procedure well. Recovery from deep sedation, oxygen supplementation provided until pt returned to baseline. Closed reduction successful , confirmed by xray

## 2020-09-23 NOTE — ED PROCEDURE NOTE - NS_POSTPROCCAREGUIDE_ED_ALL_ED
Patient is now fully awake, with vital signs and temperature stable, hydration is adequate, patients Misael’s  score is at baseline (or greater than 8), patient and escort has received  discharge education.

## 2020-09-23 NOTE — ED PROVIDER NOTE - CARE PROVIDER_API CALL
Riley Navarro  ORTHOPAEDIC SURGERY  [address] [place] [address]  Phone: [phone]  Fax: [phone]  Established Patient  Follow Up Time: Urgent

## 2020-09-24 LAB — CRP SERPL-MCNC: 1.75 MG/DL — HIGH (ref 0–0.4)

## 2020-10-14 ENCOUNTER — EMERGENCY (EMERGENCY)
Facility: HOSPITAL | Age: 35
LOS: 0 days | Discharge: HOME | End: 2020-10-14
Attending: EMERGENCY MEDICINE
Payer: COMMERCIAL

## 2020-10-14 VITALS
HEART RATE: 66 BPM | WEIGHT: 149.91 LBS | HEIGHT: 63 IN | DIASTOLIC BLOOD PRESSURE: 60 MMHG | RESPIRATION RATE: 18 BRPM | SYSTOLIC BLOOD PRESSURE: 127 MMHG | OXYGEN SATURATION: 96 % | TEMPERATURE: 100 F

## 2020-10-14 VITALS
DIASTOLIC BLOOD PRESSURE: 63 MMHG | RESPIRATION RATE: 17 BRPM | HEART RATE: 71 BPM | SYSTOLIC BLOOD PRESSURE: 117 MMHG | OXYGEN SATURATION: 100 % | TEMPERATURE: 99 F
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
## 2020-10-14 DIAGNOSIS — T84.021A DISLOCATION OF INTERNAL LEFT HIP PROSTHESIS, INITIAL ENCOUNTER: ICD-10-CM
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
## 2020-10-14 DIAGNOSIS — Z98.891 HISTORY OF UTERINE SCAR FROM PREVIOUS SURGERY: Chronic | ICD-10-CM

## 2020-10-14 LAB
ANION GAP SERPL CALC-SCNC: 7 MMOL/L — SIGNIFICANT CHANGE UP (ref 5–17)
APTT BLD: 28.9 SEC — SIGNIFICANT CHANGE UP (ref 27.5–35.5)
BUN SERPL-MCNC: 5 MG/DL — LOW (ref 7–23)
CALCIUM SERPL-MCNC: 8.8 MG/DL — SIGNIFICANT CHANGE UP (ref 8.5–10.1)
CHLORIDE SERPL-SCNC: 109 MMOL/L — HIGH (ref 96–108)
CO2 SERPL-SCNC: 26 MMOL/L — SIGNIFICANT CHANGE UP (ref 22–31)
CREAT SERPL-MCNC: 0.61 MG/DL — SIGNIFICANT CHANGE UP (ref 0.5–1.3)
GLUCOSE SERPL-MCNC: 86 MG/DL — SIGNIFICANT CHANGE UP (ref 70–99)
HCT VFR BLD CALC: 27.9 % — LOW (ref 34.5–45)
HGB BLD-MCNC: 10 G/DL — LOW (ref 11.5–15.5)
INR BLD: 1.14 RATIO — SIGNIFICANT CHANGE UP (ref 0.88–1.16)
MCHC RBC-ENTMCNC: 22.7 PG — LOW (ref 27–34)
MCHC RBC-ENTMCNC: 35.8 GM/DL — SIGNIFICANT CHANGE UP (ref 32–36)
MCV RBC AUTO: 63.4 FL — LOW (ref 80–100)
PLATELET # BLD AUTO: 228 K/UL — SIGNIFICANT CHANGE UP (ref 150–400)
POTASSIUM SERPL-MCNC: 3.6 MMOL/L — SIGNIFICANT CHANGE UP (ref 3.5–5.3)
POTASSIUM SERPL-SCNC: 3.6 MMOL/L — SIGNIFICANT CHANGE UP (ref 3.5–5.3)
PROTHROM AB SERPL-ACNC: 13.2 SEC — SIGNIFICANT CHANGE UP (ref 10.6–13.6)
RBC # BLD: 4.4 M/UL — SIGNIFICANT CHANGE UP (ref 3.8–5.2)
RBC # FLD: 16.5 % — HIGH (ref 10.3–14.5)
SODIUM SERPL-SCNC: 142 MMOL/L — SIGNIFICANT CHANGE UP (ref 135–145)
TROPONIN I SERPL-MCNC: <0.015 NG/ML — SIGNIFICANT CHANGE UP (ref 0.01–0.04)
WBC # BLD: 8.74 K/UL — SIGNIFICANT CHANGE UP (ref 3.8–10.5)
WBC # FLD AUTO: 8.74 K/UL — SIGNIFICANT CHANGE UP (ref 3.8–10.5)

## 2020-10-14 PROCEDURE — 27265 TREAT HIP DISLOCATION: CPT | Mod: LT

## 2020-10-14 PROCEDURE — 73552 X-RAY EXAM OF FEMUR 2/>: CPT | Mod: LT

## 2020-10-14 PROCEDURE — 96375 TX/PRO/DX INJ NEW DRUG ADDON: CPT | Mod: XU

## 2020-10-14 PROCEDURE — 73552 X-RAY EXAM OF FEMUR 2/>: CPT | Mod: 26,LT

## 2020-10-14 PROCEDURE — 80048 BASIC METABOLIC PNL TOTAL CA: CPT

## 2020-10-14 PROCEDURE — 73564 X-RAY EXAM KNEE 4 OR MORE: CPT | Mod: LT

## 2020-10-14 PROCEDURE — 73503 X-RAY EXAM HIP UNI 4/> VIEWS: CPT | Mod: LT

## 2020-10-14 PROCEDURE — 85730 THROMBOPLASTIN TIME PARTIAL: CPT

## 2020-10-14 PROCEDURE — 73502 X-RAY EXAM HIP UNI 2-3 VIEWS: CPT | Mod: 26,LT

## 2020-10-14 PROCEDURE — 36415 COLL VENOUS BLD VENIPUNCTURE: CPT

## 2020-10-14 PROCEDURE — 73562 X-RAY EXAM OF KNEE 3: CPT | Mod: 26,LT

## 2020-10-14 PROCEDURE — 99285 EMERGENCY DEPT VISIT HI MDM: CPT | Mod: 25

## 2020-10-14 PROCEDURE — 85610 PROTHROMBIN TIME: CPT

## 2020-10-14 PROCEDURE — 85027 COMPLETE CBC AUTOMATED: CPT

## 2020-10-14 PROCEDURE — 84484 ASSAY OF TROPONIN QUANT: CPT

## 2020-10-14 PROCEDURE — 73502 X-RAY EXAM HIP UNI 2-3 VIEWS: CPT | Mod: LT

## 2020-10-14 PROCEDURE — 96374 THER/PROPH/DIAG INJ IV PUSH: CPT | Mod: XU

## 2020-10-14 PROCEDURE — 99152 MOD SED SAME PHYS/QHP 5/>YRS: CPT

## 2020-10-14 PROCEDURE — 27265 TREAT HIP DISLOCATION: CPT | Mod: 78,LT

## 2020-10-14 RX ORDER — OXYCODONE HYDROCHLORIDE 5 MG/1
5 TABLET ORAL ONCE
Refills: 0 | Status: DISCONTINUED | OUTPATIENT
Start: 2020-10-14 | End: 2020-10-14

## 2020-10-14 RX ORDER — ONDANSETRON 8 MG/1
4 TABLET, FILM COATED ORAL ONCE
Refills: 0 | Status: COMPLETED | OUTPATIENT
Start: 2020-10-14 | End: 2020-10-14

## 2020-10-14 RX ORDER — MORPHINE SULFATE 50 MG/1
4 CAPSULE, EXTENDED RELEASE ORAL ONCE
Refills: 0 | Status: DISCONTINUED | OUTPATIENT
Start: 2020-10-14 | End: 2020-10-14

## 2020-10-14 RX ADMIN — ONDANSETRON 4 MILLIGRAM(S): 8 TABLET, FILM COATED ORAL at 20:44

## 2020-10-14 RX ADMIN — MORPHINE SULFATE 4 MILLIGRAM(S): 50 CAPSULE, EXTENDED RELEASE ORAL at 20:45

## 2020-10-14 RX ADMIN — OXYCODONE HYDROCHLORIDE 5 MILLIGRAM(S): 5 TABLET ORAL at 23:45

## 2020-10-14 NOTE — ED PROVIDER NOTE - NSFOLLOWUPCLINICS_GEN_ALL_ED_FT
[facility]  Family Medicine  [address] [place] [address]  Phone: [phone]  Fax:   Follow Up Time:

## 2020-10-14 NOTE — ED PROVIDER NOTE - CARE PROVIDER_API CALL
Loco Salinas  ORTHOPAEDIC SURGERY  [address] [place] [address]  Phone: [phone]  Fax: [phone]  Follow Up Time:

## 2020-10-14 NOTE — ED PROVIDER NOTE - PRO INTERPRETER NEED 2
Meghan Ortiz presents today for   Chief Complaint   Patient presents with    Pre-op Exam     Scheduled for R TKA on 2-3-2020 with DR. Carlisle. All preop testing completed. Depression Screening:  3 most recent PHQ Screens 1/29/2020   PHQ Not Done Patient Decline   Little interest or pleasure in doing things -   Feeling down, depressed, irritable, or hopeless -   Total Score PHQ 2 -       Learning Assessment:  Learning Assessment 5/31/2019   PRIMARY LEARNER Patient   HIGHEST LEVEL OF EDUCATION - PRIMARY LEARNER  > 4 YEARS OF COLLEGE   BARRIERS PRIMARY LEARNER NONE   CO-LEARNER CAREGIVER No   PRIMARY LANGUAGE ENGLISH   LEARNER PREFERENCE PRIMARY DEMONSTRATION     -     -     -     -   ANSWERED BY patient     -   RELATIONSHIP SELF       Abuse Screening:  Abuse Screening Questionnaire 1/30/2020   Do you ever feel afraid of your partner? N   Are you in a relationship with someone who physically or mentally threatens you? N   Is it safe for you to go home? Y       Fall Risk  Fall Risk Assessment, last 12 mths 1/29/2020   Able to walk? Yes   Fall in past 12 months? No   Fall with injury? -   Number of falls in past 12 months -   Fall Risk Score -           Coordination of Care:  1. Have you been to the ER, urgent care clinic since your last visit? Hospitalized since your last visit? no    2. Have you seen or consulted any other health care providers outside of the [address] [place] [address] since your last visit? Include any pap smears or colon screening. yes      Advance Directive:  1. Do you have an advance directive in place? Patient Reply:yes        2. Per patient no changes to their ACP contact no. English

## 2020-10-14 NOTE — ED PROVIDER NOTE - NSFOLLOWUPINSTRUCTIONS_ED_ALL_ED_FT
Please follow up with your primary care physician and your orthopedics doctor as soon as possible. You were seen in the ED, because you had a dislocation of your left hip, and you received procedural sedation and orthopedics doctors in the ER, replaced your displaced hip in place. As per orthopedics your hip is reduced back in the place. You have been provided with detailed instructions on how to weight bear as tolerated without causing another dislocation. Please follow up with your own orthopedics doctor Dr. Navarro or if you can not wish to follow up with Dr. Salinas (our orthopedics doctor) then contact the number provided for you here.     For all other health concerns including if any tingling of your leg, if worsening of pain, if you feel you had a repeat dislocation, then please return to us immediately. For all other kayleen concerns including if any chest pain, shortness of breath, palpitation, abdominal pain, return to us immediately.    Please note that you received pain management, and medications intravenously that will affect your motor function and potentially can cause you the same effects as alcohol and pain medications, therefore it is important that you do not drive, do not operate machinery, do not go up the stairs, and do not perform any tasks that require full attention and concentration.     ______________________      Hip Pain    WHAT YOU NEED TO KNOW:    Hip pain can be caused by a number of conditions, such as bursitis, arthritis, or muscle or tendon strain. You may have swelling in the fluid-filled sacs that protect your muscles and tendons. Hip pain can also be caused by a lower back problem. Hip pain may be caused by trauma, playing sports, or running. Pain may start in your hip and go to your thigh, buttock, or groin.    Hip and Pelvis         DISCHARGE INSTRUCTIONS:    Medicines:   •NSAIDs, such as ibuprofen, help decrease swelling, pain, and fever. This medicine is available with or without a doctor's order. NSAIDs can cause stomach bleeding or kidney problems in certain people. If you take blood thinner medicine, always ask your healthcare provider if NSAIDs are safe for you. Always read the medicine label and follow directions.      •Take your medicine as directed. Contact your healthcare provider if you think your medicine is not helping or if you have side effects. Tell him of her if you are allergic to any medicine. Keep a list of the medicines, vitamins, and herbs you take. Include the amounts, and when and why you take them. Bring the list or the pill bottles to follow-up visits. Carry your medicine list with you in case of an emergency.      Return to the emergency department if:   •Your pain gets worse.      •You have numbness in your leg or toes.      •You cannot put any weight on or move your hip.      Contact your healthcare provider if:   •You have a fever.      •Your pain does not decrease, even after treatment.      •You have questions or concerns about your condition or care.      Follow up with your healthcare provider as directed: You may need physical therapy, an injection, or more testing. You may need to see an orthopedic specialist. Write down your questions so you remember to ask them during your visits.    Manage your hip pain:   •Rest your injured hip so that it can heal. You may need to avoid putting any weight on your hip for at least 48 hours. Return to normal activities as directed.      •Ice the injury for 20 minutes every 4 hours, or as directed. Use an ice pack, or put crushed ice in a plastic bag. Cover it with a towel to protect your skin. Ice helps prevent tissue damage and decreases swelling and pain.      •Elevate your injured hip above the level of your heart as often as you can. This will help decrease swelling and pain. If possible, prop your hip and leg on pillows or blankets to keep the area elevated comfortably.       •Maintain a healthy weight. Extra body weight can cause pressure and pain in your hip, knee, and ankle joints. Ask your healthcare provider how much you should weigh. Ask him or her to help you create a weight loss plan if you are overweight.      •Use assistive devices as directed. You may need to use a cane or crutches. Assistive devices help decrease pain and pressure on your hip when you walk. Ask your healthcare provider for more information about assistive devices and how to use them correctly.      Hip Dislocation    WHAT YOU NEED TO KNOW:    A hip dislocation occurs when your thigh bone is forced out of your hip socket.     Hip and Pelvis         DISCHARGE INSTRUCTIONS:    Return to the emergency department if:   •You have severe pain.      •You dislocate your hip again.      Call your doctor if:   •You have a fever.      •You have pain that does not go away after you take pain medicine.      •You cannot walk well with your cane or crutches.      •You have questions or concerns about your condition or care.      Medicines: You may need the following:   •Prescription pain medicine may be given. Ask your healthcare provider how to take this medicine safely. Some prescription pain medicines contain acetaminophen. Do not take other medicines that contain acetaminophen without talking to your healthcare provider. Too much acetaminophen may cause liver damage. Prescription pain medicine may cause constipation. Ask your healthcare provider how to prevent or treat constipation.       •Take your medicine as directed. Contact your healthcare provider if you think your medicine is not helping or if you have side effects. Tell him of her if you are allergic to any medicine. Keep a list of the medicines, vitamins, and herbs you take. Include the amounts, and when and why you take them. Bring the list or the pill bottles to follow-up visits. Carry your medicine list with you in case of an emergency.      Manage a hip dislocation: It will take 2 to 3 months for your hip to heal.   •Use a walker or crutches as directed. Ask your healthcare provider or orthopedist when you can put weight on your injured side. As your hip heals, use a cane to help you walk until your limp goes away.      •Avoid high-impact activities and sports. Do this for 6 to 12 weeks or until your hip strength has returned.      •Go to physical therapy, if directed. A physical therapist teaches you exercises to increase the range of motion in your hip. Exercises also make your hip stronger and decrease pain.      Prevent another hip dislocation: Follow these precautions for 6 weeks after your injury or as directed:   •Sit with your back straight and your feet flat on the floor. Do not cross your legs. Do not lean forward when you sit in a chair.      •Keep your knees apart. Place a pillow or wedge between your knees when you sit or lie. Do not twist your knees. Do not lift your knees higher than your hips.      •Do not sit in a low chair. Use armrests and your upper body strength to push yourself up from a sitting position.      •Do not bend at the waist to  an object from the floor. Bend your knees to reach the object, or use a tool to pick it up.      Follow up with your doctor or orthopedist as directed: You may need another x-ray or CT scan. Write down your questions so you remember to ask them during your visits.    ______________

## 2020-10-14 NOTE — ED PROVIDER NOTE - MUSCULOSKELETAL, MLM
Spine appears normal. +obvious deformity to L hip with TTP Spine appears normal. +obvious deformity to L hip with TTP. No laceration. intact sensation in left leg. Left leg neurovascularly intact.

## 2020-10-14 NOTE — ED PROVIDER NOTE - CARE PLAN
Principal Discharge DX:	Dislocation of left hip, initial encounter  Goal:	dislocated prosthetic hip   Principal Discharge DX:	Dislocation of left hip, initial encounter  Goal:	dislocated prosthetic hip  Assessment and plan of treatment:	-deep sedation followed by orthopedic reduction and post reduction showing placement, strict weight bearing precautions, follow up with ortho doctor and return if any health concerns including pain, repeat dislocation or any other issues.

## 2020-10-14 NOTE — CONSULT NOTE ADULT - SUBJECTIVE AND OBJECTIVE BOX
Patient is a 35yFemale community ambulator without assistive devices who presents to [facility] ED w/ a c/o of new onset left hip pain. Patient states she was standing and made a twisting/rotation movement with leg and had a sudden onset of pain and inability to ambulate. She states she was able to bring herself down slowly. Denies fall/HS/LOC. Localizing pain to left hip, denies radiation of pain elsewhere. States inability to ambulate immediately following the injury. Denies any numbness or tingling. Denies having any other pain elsewhere. Ortho hx significant for L AUGUSTUS with Dr. Navarro. Of note patient was last seen at [facility] ED on 20 with a left hip dislocation and reports she followed up with Dr. Navarro outpatient within a week. No other orthopaedic concerns at this time.    PAST MEDICAL & SURGICAL HISTORY:  No pertinent past medical history    History of  section      No Known Allergies      PHYSICAL EXAM:  T(C): 37.7 (10-14-20 @ 19:28), Max: 37.7 (10-14-20 @ 19:28)  HR: 66 (10-14- @ 19:28) (66 - 66)  BP: 127/60 (10--20 @ 19:28) (127/60 - 127/60)  RR: 18 (10-14-20 @ 19:28) (18 - 18)  SpO2: 96% (10-14-20 @ 19:28) (96% - 96%)    Gen: NAD, Resting comfortably, following commands  LLE:  Healed Surgical incision, no active drainage or sinus tracts. Skin otherwise intact, no erythema or ecchymosis  Shortened and internally rotated  +TTP about the hip  +EHL/FHL/TA/GSC  +SILT L2-S1  + DP  Compartments soft and compressible  No calf tenderness    Secondary Survey:   Able to SLR RLE. Negative log roll / heel strike RLE. No TTP over bony prominences, SILT, palpable pulses, full/painless A/PROM, compartments soft. No TTP over spinous processes or paraspinal muscles at C/T/L spine. No palpable step off. No other injuries or complaints.      Imaging:     Patient is a 35yFemale community ambulator without assistive devices who presents to [facility] ED w/ a c/o of new onset left hip pain. Patient states she was standing and made a twisting/rotation movement with leg and had a sudden onset of pain and inability to ambulate. She states she was able to bring herself down slowly. Denies fall/HS/LOC. Localizing pain to left hip, denies radiation of pain elsewhere. States inability to ambulate immediately following the injury. Denies any numbness or tingling. Denies having any other pain elsewhere. Ortho hx significant for L AUGUSTUS with Dr. Navarro. Of note patient was last seen at [facility] ED on 20 with a left hip dislocation and reports she followed up with Dr. Navarro outpatient within a week. No other orthopaedic concerns at this time.    PAST MEDICAL & SURGICAL HISTORY:  No pertinent past medical history    History of  section  /    No Known Allergies      PHYSICAL EXAM:  T(C): 37.7 (10-14-20 @ 19:28), Max: 37.7 (10-14-20 @ 19:28)  HR: 66 (10-14-20 @ 19:28) (66 - 66)  BP: 127/60 (10--20 @ 19:28) (127/60 - 127/60)  RR: 18 (10-14- @ 19:28) (18 - 18)  SpO2: 96% (10-14-20 @ 19:28) (96% - 96%)    Gen: NAD, Resting comfortably, following commands  LLE:  Healed Surgical incision, no active drainage or sinus tracts. Skin otherwise intact, no erythema or ecchymosis  Shortened and internally rotated  +TTP about the hip  +EHL/FHL/TA/GSC  +SILT L2-S1  + DP  Compartments soft and compressible  No calf tenderness    Secondary Survey:   Able to SLR RLE. Negative log roll / heel strike RLE. No TTP over bony prominences, SILT, palpable pulses, full/painless A/PROM, compartments soft. No TTP over spinous processes or paraspinal muscles at C/T/L spine. No palpable step off. No other injuries or complaints.      Imaging:  XR L hip/femur/pelv demonstrates left posterior hip dsx s/p AUGUSTUS, no other obvious fx/dsx appreciated    Procedure:  After obtaining consent, the ED team performed conscious sedation and a closed reduction was performed and a knee immobilizer and abduction pillow were placed. The patient tolerated the procedure well and there we no complications. The patient's post-reduction neurovascular exam was unchanged. Post-reduction xrays demonstrated reduced AUGUSTUS.

## 2020-10-14 NOTE — ED ADULT TRIAGE NOTE - CHIEF COMPLAINT QUOTE
Hx of left hip replacement, states she turned wrong while doing laundry and complaining of left hip pain. Unable to ambulate due to pain.

## 2020-10-14 NOTE — ED PROVIDER NOTE - PROGRESS NOTE DETAILS
Yady ORDOÑEZ for ED attending, Dr. White: Orthopedic residents evaluated XR and found patient to have L hip dislocation. Patient is to undergo conscious sedation for manipulation of hip by orthopedics. Pt is aware of the risk od deep sedation, witnessed by ortho when discussing the risk/ benefits of procedure such as nerve damage, fracture, etc. Will set patient up for sedation. Yady ORDOÑEZ for ED attending, Dr. White: Pt was able to tolerate conscious sedation. Able to reduce L hip, immobilized and awaiting imaging to confirm placement. Yady ORDOÑEZ for ED attending, Dr. White: Xray looks like dislocatd joint on my eval. Orthopedic residents evaluated XR and found patient to have L hip dislocation. Patient is to undergo conscious sedation for manipulation of hip by orthopedics to reduce back inplace. Pt is aware of the risk  and benefits of deep sedation, spoke with patient provided informed consenet. Was present when ortho spoke with patient, explained risk and benefit of their manipulation as well. Ortho consultation is appreciated.

## 2020-10-14 NOTE — ED PROVIDER NOTE - NS_ ATTENDINGSCRIBEDETAILS _ED_A_ED_FT
I Jesus White MD saw and examined the patient. Scribe documented for me and under my supervision. I have modified the scribe's documentation where necessary to reflect my history, physical exam and other relevant documentations pertinent to the care of the patient.

## 2020-10-14 NOTE — CONSULT NOTE ADULT - ASSESSMENT
***INCOMPLETE NOTE/IN PROGRESS/Full Note To Follow***   A/P:  Patient is a 35y Female w/ left AUGUSTUS dislocation s/p closed reduction in ED    -No acute orthopaedic surgical intervention plannedat this time  -Analgesia prn  -WBAT LLE in knee immobilizer   -Posterior hip precautions  -ABD pillow and knee immobilizer at all times while in bed/chair  -PT/OT prn  -Ice and elevate as tolerated  -Orthopaedically stable for discharge  -Recommend follow up w/ Dr. Salinas or patient's own orthopaedic surgeon (Dr. Navarro) outpatient in 1 wk. Call office to schedule appointment.  -All Patient's and Family Member's questions answered. Patient/family understand and agree w/ plan.  -Will discuss w/ attending and advise if plan changes.    Aquiles Rebolledo M.D.   Orthopaedic Surgery  [phone]

## 2020-10-14 NOTE — ED PROVIDER NOTE - CARDIAC, MLM
Normal rate, regular rhythm.  Heart sounds S1, S2.  No murmurs, rubs or gallops. Normal radial and DP pulses. Normal rate, regular rhythm.  Heart sounds S1, S2.  No rubs or gallops. 2+ pulses in bilateral dp and radial arteries.

## 2020-10-14 NOTE — ED PROVIDER NOTE - OBJECTIVE STATEMENT
35 F with PMHx of L hip replacement on 9/10/20, dislocated L hip on 9/24/20 presenting to the ED c/o sudden onset L hip pain starting today. Patient reports that she was doing laundry when she turned, felt pain to L hip and lowered herself to the ground. No head strike or LOC. Not on any blood thinners. Pt was unable to get off the ground, unable to ambulate due to pain. EMS was called and picked patient off the ground and brought patient to the ED for further evaluation. Last PO intake at 10AM today. No other pain/complaints at this time. 35 F with PMHx of L hip replacement on 9/10/20, dislocated L hip on 9/24/20 presenting to the ED c/o sudden onset L hip pain starting today. Patient reports that she was doing laundry when she turned, felt pain to L hip and lowered herself to the ground. States feels like how she had felt when she last dislocaged. No head strike or LOC. Not on any blood thinners. Pt was unable to get off the ground, unable to ambulate due to pain. EMS was called and picked patient off the ground and brought patient to the ED for further evaluation. Last PO intake at 10AM today. No other pain/complaints at this time.

## 2020-10-14 NOTE — ED PROVIDER NOTE - NEUROLOGICAL, MLM
Alert and oriented, no focal deficits, no motor or sensory deficits. NIH of 0. GCS of 15. Cranial Nerves II-XII intact. Alert and oriented, no focal deficits, no motor or sensory deficits. NIH of 0. GCS of 15. Cranial Nerves II-XII intact. No dysmetria, aphasia.

## 2020-10-14 NOTE — ED PROVIDER NOTE - PATIENT PORTAL LINK FT
You can access the FollowMyHealth Patient Portal offered by  by registering at the following website: [email or web address]. By joining [facility]’s FollowMyHealth portal, you will also be able to view your health information using other applications (apps) compatible with our system.

## 2020-10-14 NOTE — ED PROVIDER NOTE - CLINICAL SUMMARY MEDICAL DECISION MAKING FREE TEXT BOX
Patient with XR evidence of dislocated prosthetic L hip. XR does not show any other pathology. Will perform conscious sedation to replace prosthetic L hip.    Conscious sedation lasted 6 minutes and gave propofol as was safe. Pt tolerated procedure well, hip reduced, neurovascularly intact. Ortho had XR confirmation of reduction and will f/u with impatient with Dr. Salinas or with other Ortho f/u.  Return precautions given if patient is having any paresthesia, Neuropathy, vascular insufficiency, weigh bearing precautions.

## 2020-10-14 NOTE — ED ADULT NURSE NOTE - OBJECTIVE STATEMENT
Pt presents to ER c/o left pain/deformity. Pt reports she was at the laundry mat when she turned and had sudden onset left hip pain and lowered herself to the ground. Pt reports having hip replacement on 09/10 and dislocated left hip on 09/24. Obvious deformity to left hip; shortening of LLE. Denies fall. AO x 3 oriented to baseline, normal breathing pattern with no difficulty.

## 2020-10-14 NOTE — ED ADULT NURSE NOTE - CAS EDN DISCHARGE ASSESSMENT
This document is complete and the patient is ready for discharge.
Alert and oriented to person, place and time

## 2020-10-14 NOTE — ED PROVIDER NOTE - PLAN OF CARE
dislocated prosthetic hip -deep sedation followed by orthopedic reduction and post reduction showing placement, strict weight bearing precautions, follow up with ortho doctor and return if any health concerns including pain, repeat dislocation or any other issues.

## 2020-12-16 ENCOUNTER — EMERGENCY (EMERGENCY)
Facility: HOSPITAL | Age: 35
LOS: 0 days | Discharge: HOME | End: 2020-12-16
Attending: STUDENT IN AN ORGANIZED HEALTH CARE EDUCATION/TRAINING PROGRAM
Payer: COMMERCIAL

## 2020-12-16 VITALS — WEIGHT: 149.91 LBS | HEIGHT: 63 IN

## 2020-12-16 VITALS
OXYGEN SATURATION: 100 % | SYSTOLIC BLOOD PRESSURE: 113 MMHG | HEART RATE: 49 BPM | RESPIRATION RATE: 16 BRPM | DIASTOLIC BLOOD PRESSURE: 50 MMHG
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
## 2020-12-16 DIAGNOSIS — T84.021A DISLOCATION OF INTERNAL LEFT HIP PROSTHESIS, INITIAL ENCOUNTER: ICD-10-CM

## 2020-12-16 DIAGNOSIS — Z98.891 HISTORY OF UTERINE SCAR FROM PREVIOUS SURGERY: Chronic | ICD-10-CM

## 2020-12-16 DIAGNOSIS — Z96.642 PRESENCE OF LEFT ARTIFICIAL HIP JOINT: ICD-10-CM

## 2020-12-16 LAB
ANION GAP SERPL CALC-SCNC: 5 MMOL/L — SIGNIFICANT CHANGE UP (ref 5–17)
BASOPHILS # BLD AUTO: 0 K/UL — SIGNIFICANT CHANGE UP (ref 0–0.2)
BASOPHILS NFR BLD AUTO: 0 % — SIGNIFICANT CHANGE UP (ref 0–2)
BUN SERPL-MCNC: 6 MG/DL — LOW (ref 7–23)
CALCIUM SERPL-MCNC: 8.6 MG/DL — SIGNIFICANT CHANGE UP (ref 8.5–10.1)
CHLORIDE SERPL-SCNC: 110 MMOL/L — HIGH (ref 96–108)
CO2 SERPL-SCNC: 27 MMOL/L — SIGNIFICANT CHANGE UP (ref 22–31)
CREAT SERPL-MCNC: 0.5 MG/DL — SIGNIFICANT CHANGE UP (ref 0.5–1.3)
CRP SERPL-MCNC: <0.1 MG/DL — SIGNIFICANT CHANGE UP (ref 0–0.4)
EOSINOPHIL # BLD AUTO: 0.1 K/UL — SIGNIFICANT CHANGE UP (ref 0–0.5)
EOSINOPHIL NFR BLD AUTO: 1 % — SIGNIFICANT CHANGE UP (ref 0–6)
ERYTHROCYTE [SEDIMENTATION RATE] IN BLOOD: 1 MM/HR — SIGNIFICANT CHANGE UP (ref 0–15)
GLUCOSE SERPL-MCNC: 81 MG/DL — SIGNIFICANT CHANGE UP (ref 70–99)
HCT VFR BLD CALC: 29.5 % — LOW (ref 34.5–45)
HGB BLD-MCNC: 10.5 G/DL — LOW (ref 11.5–15.5)
LYMPHOCYTES # BLD AUTO: 1.75 K/UL — SIGNIFICANT CHANGE UP (ref 1–3.3)
LYMPHOCYTES # BLD AUTO: 17 % — SIGNIFICANT CHANGE UP (ref 13–44)
MCHC RBC-ENTMCNC: 22 PG — LOW (ref 27–34)
MCHC RBC-ENTMCNC: 35.6 GM/DL — SIGNIFICANT CHANGE UP (ref 32–36)
MCV RBC AUTO: 61.8 FL — LOW (ref 80–100)
MONOCYTES # BLD AUTO: 0.93 K/UL — HIGH (ref 0–0.9)
MONOCYTES NFR BLD AUTO: 9 % — SIGNIFICANT CHANGE UP (ref 2–14)
NEUTROPHILS # BLD AUTO: 7.53 K/UL — HIGH (ref 1.8–7.4)
NEUTROPHILS NFR BLD AUTO: 72 % — SIGNIFICANT CHANGE UP (ref 43–77)
NRBC # BLD: SIGNIFICANT CHANGE UP /100 WBCS (ref 0–0)
PLATELET # BLD AUTO: 209 K/UL — SIGNIFICANT CHANGE UP (ref 150–400)
POTASSIUM SERPL-MCNC: 3.6 MMOL/L — SIGNIFICANT CHANGE UP (ref 3.5–5.3)
POTASSIUM SERPL-SCNC: 3.6 MMOL/L — SIGNIFICANT CHANGE UP (ref 3.5–5.3)
RBC # BLD: 4.77 M/UL — SIGNIFICANT CHANGE UP (ref 3.8–5.2)
RBC # FLD: 18 % — HIGH (ref 10.3–14.5)
SODIUM SERPL-SCNC: 142 MMOL/L — SIGNIFICANT CHANGE UP (ref 135–145)
WBC # BLD: 10.31 K/UL — SIGNIFICANT CHANGE UP (ref 3.8–10.5)
WBC # FLD AUTO: 10.31 K/UL — SIGNIFICANT CHANGE UP (ref 3.8–10.5)

## 2020-12-16 PROCEDURE — 80048 BASIC METABOLIC PNL TOTAL CA: CPT

## 2020-12-16 PROCEDURE — 96376 TX/PRO/DX INJ SAME DRUG ADON: CPT | Mod: XU

## 2020-12-16 PROCEDURE — 96374 THER/PROPH/DIAG INJ IV PUSH: CPT | Mod: XU

## 2020-12-16 PROCEDURE — 73502 X-RAY EXAM HIP UNI 2-3 VIEWS: CPT | Mod: 26,LT

## 2020-12-16 PROCEDURE — 73502 X-RAY EXAM HIP UNI 2-3 VIEWS: CPT | Mod: LT

## 2020-12-16 PROCEDURE — 99285 EMERGENCY DEPT VISIT HI MDM: CPT

## 2020-12-16 PROCEDURE — 85652 RBC SED RATE AUTOMATED: CPT

## 2020-12-16 PROCEDURE — 27265 TREAT HIP DISLOCATION: CPT | Mod: LT

## 2020-12-16 PROCEDURE — 86140 C-REACTIVE PROTEIN: CPT

## 2020-12-16 PROCEDURE — 27265 TREAT HIP DISLOCATION: CPT | Mod: 78,LT

## 2020-12-16 PROCEDURE — 81025 URINE PREGNANCY TEST: CPT

## 2020-12-16 PROCEDURE — 36415 COLL VENOUS BLD VENIPUNCTURE: CPT

## 2020-12-16 PROCEDURE — 99285 EMERGENCY DEPT VISIT HI MDM: CPT | Mod: 25

## 2020-12-16 PROCEDURE — 73502 X-RAY EXAM HIP UNI 2-3 VIEWS: CPT | Mod: 26,LT,77

## 2020-12-16 PROCEDURE — 85025 COMPLETE CBC W/AUTO DIFF WBC: CPT

## 2020-12-16 RX ORDER — PROPOFOL 10 MG/ML
100 INJECTION, EMULSION INTRAVENOUS ONCE
Refills: 0 | Status: COMPLETED | OUTPATIENT
Start: 2020-12-16 | End: 2020-12-16

## 2020-12-16 RX ORDER — PROPOFOL 10 MG/ML
250 INJECTION, EMULSION INTRAVENOUS ONCE
Refills: 0 | Status: COMPLETED | OUTPATIENT
Start: 2020-12-16 | End: 2020-12-16

## 2020-12-16 RX ORDER — MORPHINE SULFATE 50 MG/1
4 CAPSULE, EXTENDED RELEASE ORAL ONCE
Refills: 0 | Status: DISCONTINUED | OUTPATIENT
Start: 2020-12-16 | End: 2020-12-16

## 2020-12-16 RX ADMIN — PROPOFOL 250 MILLIGRAM(S): 10 INJECTION, EMULSION INTRAVENOUS at 09:20

## 2020-12-16 RX ADMIN — MORPHINE SULFATE 4 MILLIGRAM(S): 50 CAPSULE, EXTENDED RELEASE ORAL at 07:52

## 2020-12-16 RX ADMIN — PROPOFOL 100 MILLIGRAM(S): 10 INJECTION, EMULSION INTRAVENOUS at 08:52

## 2020-12-16 RX ADMIN — MORPHINE SULFATE 4 MILLIGRAM(S): 50 CAPSULE, EXTENDED RELEASE ORAL at 08:00

## 2020-12-16 RX ADMIN — MORPHINE SULFATE 4 MILLIGRAM(S): 50 CAPSULE, EXTENDED RELEASE ORAL at 08:30

## 2020-12-16 RX ADMIN — MORPHINE SULFATE 4 MILLIGRAM(S): 50 CAPSULE, EXTENDED RELEASE ORAL at 06:40

## 2020-12-16 NOTE — ED ADULT NURSE REASSESSMENT NOTE - NS ED NURSE REASSESS COMMENT FT1
Received pt from [facility] sp sedation for hip dislocation.  Pt asking for pain medication at this time, awake and alert.  VSS, will continue to monitor

## 2020-12-16 NOTE — ED ADULT TRIAGE NOTE - CHIEF COMPLAINT QUOTE
heard left hip "pop" trying to put on shoes this morning. hx  hip replacement in 9/2020. unable to ambulate secondary to pain.

## 2020-12-16 NOTE — ED PROVIDER NOTE - NSFOLLOWUPINSTRUCTIONS_ED_ALL_ED_FT
Hip Dislocation    WHAT YOU NEED TO KNOW:    A hip dislocation occurs when your thigh bone is forced out of your hip socket.     Hip and Pelvis         DISCHARGE INSTRUCTIONS:    Seek care immediately if:   •You have severe pain.      •You dislocate your hip again.      Call your doctor if:   •You have a fever.      •You have pain that does not go away after you take pain medicine.      •You cannot walk well with your cane or crutches.      •You have questions or concerns about your condition or care.      Medicines: You may need the following:   •Prescription pain medicine may be given. Ask your healthcare provider how to take this medicine safely. Some prescription pain medicines contain acetaminophen. Do not take other medicines that contain acetaminophen without talking to your healthcare provider. Too much acetaminophen may cause liver damage. Prescription pain medicine may cause constipation. Ask your healthcare provider how to prevent or treat constipation.       •Take your medicine as directed. Contact your healthcare provider if you think your medicine is not helping or if you have side effects. Tell him of her if you are allergic to any medicine. Keep a list of the medicines, vitamins, and herbs you take. Include the amounts, and when and why you take them. Bring the list or the pill bottles to follow-up visits. Carry your medicine list with you in case of an emergency.      Manage a hip dislocation: It will take 2 to 3 months for your hip to heal.   •Use a walker or crutches as directed. Ask your healthcare provider or orthopedist when you can put weight on your injured side. As your hip heals, use a cane to help you walk until your limp goes away.      •Avoid high-impact activities and sports. Do this for 6 to 12 weeks or until your hip strength has returned.      •Go to physical therapy, if directed. A physical therapist teaches you exercises to increase the range of motion in your hip. Exercises also make your hip stronger and decrease pain.      Prevent another hip dislocation: Follow these precautions for 6 weeks after your injury or as directed:   •Sit with your back straight and your feet flat on the floor. Do not cross your legs. Do not lean forward when you sit in a chair.      •Keep your knees apart. Place a pillow or wedge between your knees when you sit or lie. Do not twist your knees. Do not lift your knees higher than your hips.      •Do not sit in a low chair. Use armrests and your upper body strength to push yourself up from a sitting position.      •Do not bend at the waist to  an object from the floor. Bend your knees to reach the object, or use a tool to pick it up.      Follow up with your doctor or orthopedist as directed: You may need another x-ray or CT scan. Write down your questions so you remember to ask them during your visits.

## 2020-12-16 NOTE — ED PROVIDER NOTE - PATIENT PORTAL LINK FT
You can access the FollowMyHealth Patient Portal offered by [facility] by registering at the following website: [email or web address]. By joining [facility]’s FollowMyHealth portal, you will also be able to view your health information using other applications (apps) compatible with our system.

## 2020-12-16 NOTE — ED ADULT NURSE NOTE - NSIMPLEMENTINTERV_GEN_ALL_ED
Implemented All Fall Risk Interventions:  [place] to call system. Call bell, personal items and telephone within reach. Instruct patient to call for assistance. Room bathroom lighting operational. Non-slip footwear when patient is off stretcher. Physically safe environment: no spills, clutter or unnecessary equipment. Stretcher in lowest position, wheels locked, appropriate side rails in place. Provide visual cue, wrist band, yellow gown, etc. Monitor gait and stability. Monitor for mental status changes and reorient to person, place, and time. Review medications for side effects contributing to fall risk. Reinforce activity limits and safety measures with patient and family.

## 2020-12-16 NOTE — ED PROVIDER NOTE - PROGRESS NOTE DETAILS
Janes HUTTON: Ortho consulted; will to bedside to see patient. Janes DO: Hip reduced by ortho at bedside; patient required a TOTAL of 350mg propofol for sedation for procedure. Patient observed for over 1 hour; patient is awake, alert, feeling better at this time; no sob; tolerated po; instructed to f/u with her private orthopedist next week without fail (as already scheduled).

## 2020-12-16 NOTE — CONSULT NOTE ADULT - ASSESSMENT
Pt is a 35y female with a posterosuperior dislocation of the left total hip.  -Closed dislocation, NV intact, no obvious fractures  -Pain control  -FU ESR/CRP to exclude infectious etiology due to recurrent nature of dislocations  -XR L Hip/Pelvis: posterosuperior dislocation of left total hip  -Closed reduction of hip performed under consious sedation by ED physician.  -Post reduction XRs demonstrating adequate reduction. NV intact following reduction.  -WBAT affected extremity in knee immobilizer and abduction pillow  -Posterior dislocation precautions. Discussed with patient.  -Follow up with Dr. Navarro at scheduled appointment next week.   - Dr. Salinas is aware and agrees with the above plan.    Hattie Akbar M.D.  PGY-3 Orthopaedic Surgery

## 2020-12-16 NOTE — ED ADULT NURSE REASSESSMENT NOTE - NS ED NURSE REASSESS COMMENT FT1
Pt. is resting in bed, C/O Hip Pain, will medicate as ordered. Will cont to monitor patient closely- Pending Conscious Sedation- Safety maintained

## 2020-12-16 NOTE — ED PROVIDER NOTE - WR INTERPRETATION 1
Call in 3 days sore throat culture results  Start Ceftin now  Gargle salt water and use Tylenol as needed for sore throat and fever  Call if you have more difficulty swallowing or seek more urgent medical attention as needed 
prosthesis in place

## 2020-12-16 NOTE — ED PROVIDER NOTE - MUSCULOSKELETAL, MLM
C3 nurse attempted to contact patient. No answer. The following message was left for the patient to return the call:  Good afternoon I am a nurse calling on behalf of [facility] from the [facility].  This is a Transitional Care Call for Endy Jimenez. When you have a moment please contact us at [phone] or [phone] Monday through Friday, between the hours of 8 am to 4 pm. We look forward to speaking with you. On behalf of [facility] have a nice day.    The patient does not have a scheduled HOSFU appointment within 7-14 days post hospital discharge date 04/18/17. Message sent to Physician staff to assist with HOSFU appointment scheduling.       Spine appears normal, range of motion is limited- left hip; (+) obvious deformity of left hip

## 2020-12-16 NOTE — ED PROVIDER NOTE - OBJECTIVE STATEMENT
Patient is a 34 yo female with hx of hip dislocation; hx of hip prosthesis; patient was bending over to put her shoes on when she felt a pop; patient with hx of hip dislocation x 3 times since her surgery in Sept 2020 done at [facility]; no numbness or tingling; no other trauma or injury.

## 2020-12-16 NOTE — ED ADULT NURSE NOTE - OBJECTIVE STATEMENT
Patient A&Ox4 c/o left hip pain.  Patient reports bending down to put on shoe this morning when she heard a "pop" with sudden onset of 10/10 left hip pain.  Hx of left hip replacement in september 2020, since surgery has had 2 dislocations of left hip.  + sensation and movement in left lower extremity.

## 2020-12-16 NOTE — CONSULT NOTE ADULT - SUBJECTIVE AND OBJECTIVE BOX
Orthopaedic Surgery Consult Note    Pt is a 35y Female presenting with L hip pain and dislocation s/p bending over to put shoes on. Pt is a community ambulator at baseline. Pt has been unable to bear weight on affected extremity since time of dislocation. Pt denies numbness, tingling, or paresthesias in affected limb. Pt denies headstrike or LOC and denies any other orthopaedic injuries at this time. Pt denies taking blood thinners. Pt last ate at 1800 last night. Denies fevers, dizziness, CP, SOB, N/V, calf pain. This is patient's 3rd hip dislocation event. The last dislocation was close reduced on 10/14/20.    PMHx:  Denies    PSHx:  L AUGUSTUS (Melanie 09/20)    Allergies:  No Known Allergies    Medications:  propofol Injectable 100 milliGRAM(s) IV Push once  propofol Injectable 250 milliGRAM(s) IV Push once    Social: Denies smoking, EtOH, illicit drug use.    Labs:  FU CBC, BMP, ESR, CRP    Imaging:  XR L Hip/Pelvis: superoposterior total hip dislocation    Vitals:  T(C): 36.6 (12-16-20 @ 07:40), Max: 36.9 (12-16-20 @ 05:56)  HR: 64 (12-16-20 @ 08:52) (59 - 97)  BP: 112/66 (12-16-20 @ 08:52) (112/66 - 132/85)  RR: 16 (12-16-20 @ 08:52) (16 - 19)  SpO2: 100% (12-16-20 @ 08:52) (97% - 100%)    Physical Exam:  Gen: NAD, AAOx3    LLE:   Skin intact  Well-healed surgical incision  No edema, erythema, ecchymosis  No gross deformity  Limb is shortened and externally rotated  No bony TTP of Knee/Foot/Toes  Unable to SLR  +EHL/FHL/TA/GS  SILT L2-S1  +DP/PT Pulses  Compartments soft and compressible  No calf TTP B/L    Secondary Assessment:  NC/AT, NTTP of clavicles, NTTP of C-,T-,L-Spine, NTTP of Pelvis  UEs: NTTP of Shoulders, Elbows, Wrists, Hands; NT with AROM/PROM of Shoulders, Elbows, Wrists, Hands; AIN/PIN/Med/Uln/Msc/Rad/Ax intact  RLE: Able to SLR, NT with Log Roll, NT with Heel Strike, NTTP of Hip, Knee, Ankle, Foot; NT with AROM/PROM of Hip, Knee, Ankle, Foot; Q/H/Gsc/TA/EHL/FHL intact    Procedure:  Discussed risks, benefits, and alternatives to procedure with patient at bedside. Pt expressed understanding and all questions were answered. Consent for procedure was verbally obtained. Closed reduction of hip dislocation was obtained under conscious sedation by ED. Pt was administered 350 mg of propofol over 20 mins. Post-reduction XRs demontrated adequate reduction and patient was neurovascularly intact. Pt was placed in a knee immobilizer and abduction pillow. Pt was subsequently monitored in the ED until safe for discharge following sedation.

## 2020-12-16 NOTE — PROCEDURAL SAFETY CHECKLIST WITH OR WITHOUT SEDATION - NSPRELATERALITYSD_GEN_ALL_CORE
"Family Medicine Telephone Visit Note           Telephone Visit Consent   Patient was verbally read the following and verbal consent was obtained.    \"Telephone visits are billed at different rates depending on your insurance coverage. During this emergency period, for some insurers they may be billed the same as an in-person visit.  Please reach out to your insurance provider with any questions.  If during the course of the call the physician/provider feels a telephone visit is not appropriate, you will not be charged for this service.\"    Name person giving consent:  Patient   Date verbal consent given:  5/14/2020  Time verbal consent given:  8:44 AM      Chief Complaint   Patient presents with     Seizures     started on Monday, had one Tuesday and a really bad one yesterday, Did have a few last week as well.  Not on any meds for this.      Refill Request     neb solution, helps with his wheezing              HPI   Patients name: Isiah  Appointment start time:  8:47 am    This is a 78-year-old male with a history of COPD, rectal cancer in remission, PE, prediabetes, hyperlipidemia, diastolic dysfunction, CKD, depression and smoking calling to discuss seizure-like episodes.  I reviewed the phone visit from 5/13 at which time he had called our overnight provider to discuss a seizure episode and was advised to come to the emergency department for evaluation.  He declined and asked to be seen via phone in clinic today.  Of note, he has a history of these episodes and I reviewed notes from 2016 discussing prior work-up.  Patient states that he has been having seizure-like episodes for at least the past 10 years.  He describes these as episodes in which he will \"blacked out\" for a few seconds and then come to and noticed that his entire body is shaking.  The shaking involves both his upper and lower extremities bilaterally.  This will last for 30 to 40 seconds and then resolved.  It takes him about a minute to feel " back to normal.  There is no postictal confusion.  He has never lost control of his bladder or bowels nor bit his tongue during these episodes.  These have been occurring more frequently over the last year and in the month they have been very frequent.  They are occurring about 3-4 times per week.  He has fallen during these episodes and hit his head last summer against travel at which time he had to be seen in the emergency department.  He describes this as a TBI.  I reviewed his CT which was negative for any abnormality at that time.  He is afraid to go anywhere due to fear of having these episodes.  These occur randomly throughout the day and come on unexpectedly.  It has never happened at night.  He denies any symptoms of lightheadedness, shortness of breath, nausea, clamminess, fullness in the ears, specific emotion prior to the onset.  He feels well in between episodes.  He describes seeing a neurologist in 2013 but does not recall ever having any G.  He has had an MRI most recently in 2016 that was unremarkable.  He denies any pain during these episodes.  Last night, he had a significant one in which his daughter had to hold him down.  She advised that he go see a doctor.  He lives in an assisted living facility where his vitals are checked twice daily.  His blood pressures typically run in the 130s 140s.  He denies any hypotension.  He has not had any recent fevers, chills, cold symptoms.  He denies any localized numbness, tingling, weakness apart from chronic numbness in his toes and fingers due to peripheral neuropathy. Episodes have occurred while coughing or when swallowing but most often it occurs at random. States his mood is good as he sees a psychologist twice monthly. Denies any drug or alcohol use. He has a history of rectal cancer that has been in remission for 5 years and is due for a surveillance scan but this has been postponed due to COVID.       Current Outpatient Medications   Medication Sig  "Dispense Refill     ACETAMINOPHEN PO Take 500 mg by mouth       albuterol (PROAIR HFA/PROVENTIL HFA/VENTOLIN HFA) 108 (90 Base) MCG/ACT inhaler Inhale 2 puffs into the lungs every 6 hours 1 Inhaler 11     amLODIPine (NORVASC) 5 MG tablet Take 1 tablet (5 mg) by mouth daily 90 tablet 3     aspirin (ASA) 81 MG chewable tablet Take 1 tablet (81 mg) by mouth daily Chew and swallow 1 tablet daily 90 tablet 3     atorvastatin (LIPITOR) 80 MG tablet TAKE 1 TABLET(80 MG) BY MOUTH DAILY 90 tablet 0     beclomethasone HFA (QVAR REDIHALER) 80 MCG/ACT inhaler Inhale 1 puff into the lungs 2 times daily       famotidine (PEPCID) 40 MG tablet Take 1 tablet (40 mg) by mouth daily 90 tablet 3     FLUoxetine (PROZAC) 40 MG capsule TAKE 1 CAPSULE(40 MG) BY MOUTH DAILY 90 capsule 1     levothyroxine (SYNTHROID/LEVOTHROID) 200 MCG tablet TAKE 1 TABLET BY MOUTH ONCE DAILY. ALSO TAKE A 25MCG TABLET DAILY FOR A TOTAL DOSE  MCG PER DAY 90 tablet 1     levothyroxine (SYNTHROID/LEVOTHROID) 25 MCG tablet Take 1 tablet (25 mcg) by mouth daily 90 tablet 3     polyethylene glycol (MIRALAX) powder Take 1 capful every other day 510 g 1     BUTT PASTE - REGULAR (DR LOVE POOP GOOP BUTT PASTE FORMULA) Please combine stomahesive and nystatin per pharmacy recipe.  Apply up to 6x daily PRN for rectal irritation. (Patient not taking: Reported on 7/31/2019) 105 g 1     order for DME Adult nebulizer machine with mask and tubing. 1 each 0     order for DME Equipment being ordered: Nebulizer and supplies (mask and tubing) 1 each 0     order for DME Equipment being ordered: Rollator with seat and brakes. 1 Units 0     order for DME Equipment being ordered: automatic blood pressure monitor 1 Units 0     Allergies   Allergen Reactions     Penicillins Swelling              Physical Exam:     Wt 87.1 kg (192 lb)   BMI 31.95 kg/m    Estimated body mass index is 31.95 kg/m  as calculated from the following:    Height as of 7/23/18: 1.651 m (5' 5\").    Weight " as of this encounter: 87.1 kg (192 lb).    Exam:  Constitutional: healthy, alert and no distress  Psychiatric: mentation appears normal    MRI brain 10/13/2016 unremarkable.  US carotid bilateral 9/28/2016 no significant stenosis.  CT head 7/28/2019 unremarkable.    Most recent labs reviewed from 3/2020:   BMP remarkable for Cr 1.53.  Hb 12.4.  TSH 5/2019 2.1.  Hb A1c 4/2019 5.7%.        Assessment and Plan   1. Seizure-like activity (H)  Patient describing 10 years of seizure-like episodes with worsening over the last several months.  Differential includes seizure, syncope, migraines, narcolepsy, pseudoseizure, spasticity, TIA, long QT, metabolic derangement, thyroid disease, tumor, alcohol, substance use, cancer, anemia.  He denies migraines, sleep troubles.  Previous work-up was included a normal MRI of the brain in 2016 and a head CT in July that was unremarkable.  States he has seen neurology in 2013 but does not recall ever having an EEG done.  There are no symptoms preceding episodes to suggest vasovagal etiology or other symptoms suggestive of syncope.  His vitals seem to be stable around these episodes including blood pressure.  He has had previous work-up for TIA including a carotid ultrasound in 2016 that was unremarkable.  Denies any alcohol or drug use.  He has had an EKG in April 2019 showing a QTC of 464.  Previous metabolic lab work including electrolytes and thyroid has been normal.  He does have a history of rectal cancer however he states this is in remission for 5 years.  He is due for surveillance screening.  Since his last MRI was in 2016 and his episodes of become more frequent and it does not seem that he is ever had an EEG, I think it is reasonable to order repeat MRI and have him see neurology again.  He is in agreement this plan.  - NEUROLOGY ADULT REFERRAL; Future  - MRI BRAIN W CONTRAST; Future    2. Chronic obstructive pulmonary disease, unspecified COPD type (H)  Refilled.  - albuterol  (PROVENTIL) (2.5 MG/3ML) 0.083% neb solution; Take 1 vial (2.5 mg) by nebulization every 6 hours as needed for shortness of breath / dyspnea or wheezing  Dispense: 1 vial; Refill: 11    Refilled medications that would be required in the next 3 months.     After Visit Information:  Patient chose to view AVS via [employer]    Appointment end time: 9:10 am  This is a telephone visit that took 23 minutes.      Clinician location:  [place]    Kristine Olivares MD  I precepted today with Dr. Jones.               left

## 2021-01-14 ENCOUNTER — OUTPATIENT (OUTPATIENT)
Dept: OUTPATIENT SERVICES | Facility: HOSPITAL | Age: 36
LOS: 1 days | Discharge: HOME | End: 2021-01-14
Payer: COMMERCIAL

## 2021-01-14 DIAGNOSIS — Z98.891 HISTORY OF UTERINE SCAR FROM PREVIOUS SURGERY: Chronic | ICD-10-CM

## 2021-01-14 DIAGNOSIS — Z01.818 ENCOUNTER FOR OTHER PREPROCEDURAL EXAMINATION: ICD-10-CM

## 2021-01-14 DIAGNOSIS — M16.12 UNILATERAL PRIMARY OSTEOARTHRITIS, LEFT HIP: ICD-10-CM

## 2021-01-14 DIAGNOSIS — Z96.642 PRESENCE OF LEFT ARTIFICIAL HIP JOINT: Chronic | ICD-10-CM

## 2021-01-14 DIAGNOSIS — Z96.642 PRESENCE OF LEFT ARTIFICIAL HIP JOINT: ICD-10-CM

## 2021-01-14 LAB
ANION GAP SERPL CALC-SCNC: 5 MMOL/L — SIGNIFICANT CHANGE UP (ref 5–17)
APTT BLD: 28.4 SEC — SIGNIFICANT CHANGE UP (ref 27.5–35.5)
BLD GP AB SCN SERPL QL: SIGNIFICANT CHANGE UP
BUN SERPL-MCNC: 9 MG/DL — SIGNIFICANT CHANGE UP (ref 7–23)
CALCIUM SERPL-MCNC: 8.8 MG/DL — SIGNIFICANT CHANGE UP (ref 8.5–10.1)
CHLORIDE SERPL-SCNC: 107 MMOL/L — SIGNIFICANT CHANGE UP (ref 96–108)
CO2 SERPL-SCNC: 26 MMOL/L — SIGNIFICANT CHANGE UP (ref 22–31)
CREAT SERPL-MCNC: 0.62 MG/DL — SIGNIFICANT CHANGE UP (ref 0.5–1.3)
GLUCOSE SERPL-MCNC: 74 MG/DL — SIGNIFICANT CHANGE UP (ref 70–99)
HCG UR QL: NEGATIVE — SIGNIFICANT CHANGE UP
HCT VFR BLD CALC: 31.6 % — LOW (ref 34.5–45)
HGB BLD-MCNC: 11 G/DL — LOW (ref 11.5–15.5)
INR BLD: 1.01 RATIO — SIGNIFICANT CHANGE UP (ref 0.88–1.16)
MCHC RBC-ENTMCNC: 22.4 PG — LOW (ref 27–34)
MCHC RBC-ENTMCNC: 34.8 GM/DL — SIGNIFICANT CHANGE UP (ref 32–36)
MCV RBC AUTO: 64.2 FL — LOW (ref 80–100)
MRSA PCR RESULT.: SIGNIFICANT CHANGE UP
NRBC # BLD: 0 /100 WBCS — SIGNIFICANT CHANGE UP (ref 0–0)
PLATELET # BLD AUTO: 246 K/UL — SIGNIFICANT CHANGE UP (ref 150–400)
POTASSIUM SERPL-MCNC: 4.1 MMOL/L — SIGNIFICANT CHANGE UP (ref 3.5–5.3)
POTASSIUM SERPL-SCNC: 4.1 MMOL/L — SIGNIFICANT CHANGE UP (ref 3.5–5.3)
PROTHROM AB SERPL-ACNC: 11.7 SEC — SIGNIFICANT CHANGE UP (ref 10.6–13.6)
RBC # BLD: 4.92 M/UL — SIGNIFICANT CHANGE UP (ref 3.8–5.2)
RBC # FLD: 17.2 % — HIGH (ref 10.3–14.5)
S AUREUS DNA NOSE QL NAA+PROBE: SIGNIFICANT CHANGE UP
SODIUM SERPL-SCNC: 138 MMOL/L — SIGNIFICANT CHANGE UP (ref 135–145)
WBC # BLD: 8.26 K/UL — SIGNIFICANT CHANGE UP (ref 3.8–10.5)
WBC # FLD AUTO: 8.26 K/UL — SIGNIFICANT CHANGE UP (ref 3.8–10.5)

## 2021-01-14 PROCEDURE — 93010 ELECTROCARDIOGRAM REPORT: CPT

## 2021-01-14 NOTE — OCCUPATIONAL THERAPY INITIAL EVALUATION ADULT - PERTINENT HX OF CURRENT PROBLEM, REHAB EVAL
Pt is s/p L AUGUSTUS on 9/10/20. Pt reported that she re-injured her hip trying to get out of the bed. Pt reports "I tore the ligaments in my hip". Pt is scheduled for L AUGUSTUS revision on 1/21/21.

## 2021-01-14 NOTE — OCCUPATIONAL THERAPY INITIAL EVALUATION ADULT - ADDITIONAL COMMENTS
Pt had her L AUGUSTUS done at [facility] on 9/10/20 and went home and had home PT. Pt lives with family in a private house with 3 steps to enter with bilateral handrails that are far apart. Once inside, the patient has 12 steps with a L rail to reach the main floor where the bedroom and bathroom is. The patients bathroom has a tub/shower combination, fixed/retractable shower head, standard toilet seat and grab bars. The pt reports that she gave away her 3/1 commode. Pt reports that she will privately purchase a raised toilet seat with arms. Pt ambulates with no device and owns a rolling walker and a straight cane. The pt daily pain is a 10/10 at rest and with movement. The patient manages the pain with rest, Tylenol and Aleve. The pt has no recent outpatient PT. no recent falls and reports that her leg zeeshan all the time. The patient does not wear glasses, R handed, does not drive and has no hearing impairments. Pt reports that she has had 4 hip dislocations from late september until this.....

## 2021-01-14 NOTE — PHYSICAL THERAPY INITIAL EVALUATION ADULT - MODIFIED CLINICAL TEST OF SENSORY INTEGRATION IN BALANCE TEST
30 second Sit to Stand Test: reps: 5 Functional assessment of lower extremity strength and ability to maintain balance in standing, discriminates those with low and high levels of functional activity. One Leg Stand Test (OLST): Left: 2 seconds. Functional test to assess fall risk. Both gait and stair negotiation require components of OLST. Participants unable to perform the OLST for at least 5 seconds are at increased risk for injurious fall.

## 2021-01-14 NOTE — PHYSICAL THERAPY INITIAL EVALUATION ADULT - ADDITIONAL COMMENTS
(As per PT Bernardo on September 2020) Pre-op verified and confirmed: Home set-up: lives with  & children in private house with 3 stair steps with x2 handrails (far apart) to enter at front. Bedroom is at 2nd floor with 12 stair steps to negotiate, x1 left handrail. Bathroom at each floor is a shower/tub combination with grab rails, standard height toilet seats, with fixed + retractable shower heads. Endorses will be supported by post-op by . Patient is currently independent with mobility.    **Per patient ~2 weeks after primary LTH Pt caught her left leg on a blanket in bed and had increased pain. Taken to [facility] where found to have torn tendons to L hip and a fractured acetabular component. Sent home & after which dislocated 4 more times, non-traumatic, but required relocation multiple times.

## 2021-01-14 NOTE — OCCUPATIONAL THERAPY INITIAL EVALUATION ADULT - PRECAUTIONS/LIMITATIONS, REHAB EVAL
Pt reports that she still has L posterior hip precautions as per Dr Navarro./fall precautions/left hip precautions

## 2021-01-14 NOTE — H&P PST ADULT - MUSCULOSKELETAL
limping at times, no devices in use/no joint swelling/no joint erythema/no joint warmth/no calf tenderness/decreased ROM due to pain/diminished strength detailed exam details… hip brace/no joint swelling/no joint erythema/no joint warmth/no calf tenderness/decreased ROM due to pain/diminished strength

## 2021-01-14 NOTE — H&P PST ADULT - ASSESSMENT
DX: unilateral primary osteoarthritis, left hip and evaluated for a scheduled left total hip arthroplasty on 9/10/20 36 y/o F with no significant past medical history fell while at home on 2019 she received cortisone injection, PT and trigger point therapy with minimal relief.  She underwent a left hip replacement on 9/10/2020. Less than one month postoperatively she reports that she got her legs caught in the sheets and pulled her leg out reinjuring her hip.  She is scheduled for a left total hip arthroplasty revision on 2021.    CAPRINI SCORE [CLOT]    AGE RELATED RISK FACTORS                                                       MOBILITY RELATED FACTORS  [ ] Age 41-60 years                                            (1 Point)                  [ ] Bed rest                                                        (1 Point)  [ ] Age: 61-74 years                                           (2 Points)                 [ ] Plaster cast                                                   (2 Points)  [ ] Age= 75 years                                              (3 Points)                 [ ] Bed bound for more than 72 hours                 (2 Points)    DISEASE RELATED RISK FACTORS                                               GENDER SPECIFIC FACTORS  [ ] Edema in the lower extremities                       (1 Point)                  [ ] Pregnancy                                                     (1 Point)  [ ] Varicose veins                                               (1 Point)                  [ ] Post-partum < 6 weeks                                   (1 Point)             [ ] BMI > 25 Kg/m2                                            (1 Point)                  [ ] Hormonal therapy  or oral contraception          (1 Point)                 [ ] Sepsis (in the previous month)                        (1 Point)                  [ ] History of pregnancy complications                 (1 point)  [ ] Pneumonia or serious lung disease                                               [ ] Unexplained or recurrent                     (1 Point)           (in the previous month)                               (1 Point)  [ ] Abnormal pulmonary function test                     (1 Point)                 SURGERY RELATED RISK FACTORS  [ ] Acute myocardial infarction                              (1 Point)                 [ ]  Section                                             (1 Point)  [ ] Congestive heart failure (in the previous month)  (1 Point)               [ ] Minor surgery                                                  (1 Point)   [ ] Inflammatory bowel disease                             (1 Point)                 [ ] Arthroscopic surgery                                        (2 Points)  [ ] Central venous access                                      (2 Points)                [ ] General surgery lasting more than 45 minutes   (2 Points)       [ ] Stroke (in the previous month)                          (5 Points)               [x ] Elective arthroplasty                                         (5 Points)                                                                                                                                               HEMATOLOGY RELATED FACTORS                                                 TRAUMA RELATED RISK FACTORS  [ ] Prior episodes of VTE                                     (3 Points)                [ ] Fracture of the hip, pelvis, or leg                       (5 Points)  [ ] Positive family history for VTE                         (3 Points)                 [ ] Acute spinal cord injury (in the previous month)  (5 Points)  [ ] Prothrombin 54247 A                                     (3 Points)                 [ ] Paralysis  (less than 1 month)                             (5 Points)  [ ] Factor V Leiden                                             (3 Points)                  [ ] Multiple Trauma within 1 month                        (5 Points)  [ ] Lupus anticoagulants                                     (3 Points)                                                           [ ] Anticardiolipin antibodies                               (3 Points)                                                       [ ] High homocysteine in the blood                      (3 Points)                                             [ ] Other congenital or acquired thrombophilia      (3 Points)                                                [ ] Heparin induced thrombocytopenia                  (3 Points)                                          Total Score [   5       ]    Caprini Score 0 - 2:  Low Risk, No VTE Prophylaxis required for most patients, encourage ambulation  Caprini Score 3 - 6:  At Risk, pharmacologic VTE prophylaxis is indicated for most patients (in the absence of a contraindication)  Caprini Score Greater than or = 7:  High Risk, pharmacologic VTE prophylaxis is indicated for most patients (in the absence of a contraindication)

## 2021-01-14 NOTE — OCCUPATIONAL THERAPY INITIAL EVALUATION ADULT - NS ASR OT EQUIP NEEDS DISCH
Recommending 3/1 commode for safe transfers and ADL management. Patient reports that she will privately purchase a raised toilet seat with arms,

## 2021-01-14 NOTE — PHYSICAL THERAPY INITIAL EVALUATION ADULT - DISCHARGE DISPOSITION, PT EVAL
Pending functional status post-op will likely require home with home PT with commode. Pt reports already owns a rolling walker and still in good condition. Pt verbalizes preference for short-term rehab and had discussed this with surgeon.

## 2021-01-14 NOTE — H&P PST ADULT - HISTORY OF PRESENT ILLNESS
34 y/o F presents to PST w/ a preop dx of unilateral primary osteoarthritis, left hip and to be evaluated for a scheduled left total hip arthroplasty on 9/10/20.  Pt states pain to left hip x1 year after a fall on concrete on a driveway. Went to the ED the next day and a collateral fx of femur diagnosed Pt underwent cortisone injection, PT and trigger point therapy w/ partial relief. Pt re evaluated and severe OA noted, recommended sx at this time.     under went a right hip replacement, she report her got caught in the sheets, pulling her leg out an re injurying her hip she     She denies fever, cough, SOB, recent travels, and sick contacts.    Goal: walk without pain  34 y/o F with no significant past medical history fell while at home on July 2019 she received cortisone injection, PT and trigger point therapy with minimal relief.  She underwent a left hip replacement on 9/10/2020. Less than one month postoperatively she reports that she got her legs caught in the sheets and pulled her leg out reinjuring her hip.  She is scheduled for a left total hip arthroplasty revision on 1/21/2021.     She denies fever, cough, SOB, recent travels, and sick contacts.    Goal: walk without pain

## 2021-01-14 NOTE — PHYSICAL THERAPY INITIAL EVALUATION ADULT - GENERAL OBSERVATIONS, REHAB EVAL
Patient encountered sitting in PST waiting area, agreeable to PT pre-op evaluation. Patient is for elective revision of L total hip arthroplasty at a later date from now. Tolerated all aspects of evaluation without undue events, please see further PT documentation for details.

## 2021-01-14 NOTE — H&P PST ADULT - NSICDXPROBLEM_GEN_ALL_CORE_FT
PROBLEM DIAGNOSES  Problem: Unilateral primary osteoarthritis, left hip  Assessment and Plan: Preop instructions provided including npo status, Hibiclens wash for infection control and ensure/ mrsa protocols. Pt to c/w current meds, aware to stop any NSAIDS, OTC herbals or MVI on 9/3/20. Verbilized understanding. BW done, pending results. DVT prophylasix as per primary team.  Aware to f/u on covid testing prior to sx as per pst protocol and will make appt for 9/7-8 at [facility]. PROBLEM DIAGNOSES  Problem: Unilateral primary osteoarthritis, left hip  Assessment and Plan: left total hip arthoplasty revision     Problem: Presence of artificial hip joint, left  Assessment and Plan: left total hip arthroplasty revision     Problem: Preop examination  Assessment and Plan: labs - cbc,pt/ptt,bmp,t&s,nose cx,ekg  M/C required  preop 3 day hibiclens instruction reviewed and given .instructed on if  nose cx positive use mupuricin 5 days and checklist given  take routine meds DOS with sips of water. avoid NSAID and OTC supplements. verbalized understanding  information on proper nutrition , increase protein and better food choices provided in packet

## 2021-01-15 LAB
A1C WITH ESTIMATED AVERAGE GLUCOSE RESULT: 4.3 % — SIGNIFICANT CHANGE UP (ref 4–5.6)
ESTIMATED AVERAGE GLUCOSE: 77 MG/DL — SIGNIFICANT CHANGE UP (ref 68–114)

## 2021-01-18 ENCOUNTER — APPOINTMENT (OUTPATIENT)
Dept: DISASTER EMERGENCY | Facility: CLINIC | Age: 36
End: 2021-01-18

## 2021-01-18 ENCOUNTER — LABORATORY RESULT (OUTPATIENT)
Age: 36
End: 2021-01-18

## 2021-01-18 DIAGNOSIS — Z01.818 ENCOUNTER FOR OTHER PREPROCEDURAL EXAMINATION: ICD-10-CM

## 2021-01-20 ENCOUNTER — TRANSCRIPTION ENCOUNTER (OUTPATIENT)
Age: 36
End: 2021-01-20

## 2021-01-21 ENCOUNTER — RESULT REVIEW (OUTPATIENT)
Age: 36
End: 2021-01-21

## 2021-01-21 ENCOUNTER — INPATIENT (INPATIENT)
Facility: HOSPITAL | Age: 36
LOS: 0 days | Discharge: HOME | End: 2021-01-22
Attending: ORTHOPAEDIC SURGERY | Admitting: ORTHOPAEDIC SURGERY
Payer: COMMERCIAL

## 2021-01-21 ENCOUNTER — TRANSCRIPTION ENCOUNTER (OUTPATIENT)
Age: 36
End: 2021-01-21

## 2021-01-21 VITALS
WEIGHT: 149.91 LBS | HEART RATE: 68 BPM | TEMPERATURE: 99 F | SYSTOLIC BLOOD PRESSURE: 111 MMHG | RESPIRATION RATE: 18 BRPM | DIASTOLIC BLOOD PRESSURE: 45 MMHG | OXYGEN SATURATION: 99 % | HEIGHT: 63 IN

## 2021-01-21 DIAGNOSIS — Z98.891 HISTORY OF UTERINE SCAR FROM PREVIOUS SURGERY: Chronic | ICD-10-CM

## 2021-01-21 DIAGNOSIS — Z96.642 PRESENCE OF LEFT ARTIFICIAL HIP JOINT: Chronic | ICD-10-CM

## 2021-01-21 LAB
ANION GAP SERPL CALC-SCNC: 5 MMOL/L — SIGNIFICANT CHANGE UP (ref 5–17)
BLD GP AB SCN SERPL QL: SIGNIFICANT CHANGE UP
BUN SERPL-MCNC: 15 MG/DL — SIGNIFICANT CHANGE UP (ref 7–23)
CALCIUM SERPL-MCNC: 8.5 MG/DL — SIGNIFICANT CHANGE UP (ref 8.5–10.1)
CHLORIDE SERPL-SCNC: 109 MMOL/L — HIGH (ref 96–108)
CO2 SERPL-SCNC: 26 MMOL/L — SIGNIFICANT CHANGE UP (ref 22–31)
CREAT SERPL-MCNC: 0.55 MG/DL — SIGNIFICANT CHANGE UP (ref 0.5–1.3)
GLUCOSE SERPL-MCNC: 88 MG/DL — SIGNIFICANT CHANGE UP (ref 70–99)
GRAM STN FLD: SIGNIFICANT CHANGE UP
HCG UR QL: NEGATIVE — SIGNIFICANT CHANGE UP
HCT VFR BLD CALC: 29.4 % — LOW (ref 34.5–45)
HGB BLD-MCNC: 10.6 G/DL — LOW (ref 11.5–15.5)
MCHC RBC-ENTMCNC: 22.7 PG — LOW (ref 27–34)
MCHC RBC-ENTMCNC: 36.1 GM/DL — HIGH (ref 32–36)
MCV RBC AUTO: 63 FL — LOW (ref 80–100)
NRBC # BLD: 0 /100 WBCS — SIGNIFICANT CHANGE UP (ref 0–0)
PLATELET # BLD AUTO: 216 K/UL — SIGNIFICANT CHANGE UP (ref 150–400)
POTASSIUM SERPL-MCNC: 4.3 MMOL/L — SIGNIFICANT CHANGE UP (ref 3.5–5.3)
POTASSIUM SERPL-SCNC: 4.3 MMOL/L — SIGNIFICANT CHANGE UP (ref 3.5–5.3)
RBC # BLD: 4.67 M/UL — SIGNIFICANT CHANGE UP (ref 3.8–5.2)
RBC # FLD: 16.7 % — HIGH (ref 10.3–14.5)
SODIUM SERPL-SCNC: 140 MMOL/L — SIGNIFICANT CHANGE UP (ref 135–145)
SPECIMEN SOURCE: SIGNIFICANT CHANGE UP
WBC # BLD: 9.42 K/UL — SIGNIFICANT CHANGE UP (ref 3.8–10.5)
WBC # FLD AUTO: 9.42 K/UL — SIGNIFICANT CHANGE UP (ref 3.8–10.5)

## 2021-01-21 PROCEDURE — 88331 PATH CONSLTJ SURG 1 BLK 1SPC: CPT | Mod: 26

## 2021-01-21 PROCEDURE — 88305 TISSUE EXAM BY PATHOLOGIST: CPT | Mod: 26

## 2021-01-21 PROCEDURE — 88300 SURGICAL PATH GROSS: CPT | Mod: 26,59

## 2021-01-21 RX ORDER — ONDANSETRON 8 MG/1
4 TABLET, FILM COATED ORAL ONCE
Refills: 0 | Status: DISCONTINUED | OUTPATIENT
Start: 2021-01-21 | End: 2021-01-21

## 2021-01-21 RX ORDER — CEFAZOLIN SODIUM 1 G
2000 VIAL (EA) INJECTION EVERY 8 HOURS
Refills: 0 | Status: COMPLETED | OUTPATIENT
Start: 2021-01-21 | End: 2021-01-22

## 2021-01-21 RX ORDER — SENNA PLUS 8.6 MG/1
2 TABLET ORAL AT BEDTIME
Refills: 0 | Status: DISCONTINUED | OUTPATIENT
Start: 2021-01-21 | End: 2021-01-22

## 2021-01-21 RX ORDER — OXYCODONE HYDROCHLORIDE 5 MG/1
5 TABLET ORAL EVERY 4 HOURS
Refills: 0 | Status: DISCONTINUED | OUTPATIENT
Start: 2021-01-21 | End: 2021-01-22

## 2021-01-21 RX ORDER — OXYCODONE HYDROCHLORIDE 5 MG/1
10 TABLET ORAL
Refills: 0 | Status: DISCONTINUED | OUTPATIENT
Start: 2021-01-21 | End: 2021-01-22

## 2021-01-21 RX ORDER — OXYCODONE HYDROCHLORIDE 5 MG/1
5 TABLET ORAL
Refills: 0 | Status: DISCONTINUED | OUTPATIENT
Start: 2021-01-21 | End: 2021-01-22

## 2021-01-21 RX ORDER — SODIUM CHLORIDE 9 MG/ML
1000 INJECTION, SOLUTION INTRAVENOUS
Refills: 0 | Status: DISCONTINUED | OUTPATIENT
Start: 2021-01-21 | End: 2021-01-22

## 2021-01-21 RX ORDER — ACETAMINOPHEN 500 MG
1000 TABLET ORAL ONCE
Refills: 0 | Status: COMPLETED | OUTPATIENT
Start: 2021-01-21 | End: 2021-01-21

## 2021-01-21 RX ORDER — CELECOXIB 200 MG/1
200 CAPSULE ORAL ONCE
Refills: 0 | Status: COMPLETED | OUTPATIENT
Start: 2021-01-21 | End: 2021-01-21

## 2021-01-21 RX ORDER — ACETAMINOPHEN 500 MG
650 TABLET ORAL ONCE
Refills: 0 | Status: COMPLETED | OUTPATIENT
Start: 2021-01-21 | End: 2021-01-21

## 2021-01-21 RX ORDER — HYDROMORPHONE HYDROCHLORIDE 2 MG/ML
1 INJECTION INTRAMUSCULAR; INTRAVENOUS; SUBCUTANEOUS EVERY 4 HOURS
Refills: 0 | Status: DISCONTINUED | OUTPATIENT
Start: 2021-01-21 | End: 2021-01-22

## 2021-01-21 RX ORDER — ASCORBIC ACID 60 MG
500 TABLET,CHEWABLE ORAL
Refills: 0 | Status: DISCONTINUED | OUTPATIENT
Start: 2021-01-21 | End: 2021-01-22

## 2021-01-21 RX ORDER — DEXAMETHASONE 0.5 MG/5ML
10 ELIXIR ORAL ONCE
Refills: 0 | Status: COMPLETED | OUTPATIENT
Start: 2021-01-22 | End: 2021-01-22

## 2021-01-21 RX ORDER — ASPIRIN/CALCIUM CARB/MAGNESIUM 324 MG
81 TABLET ORAL
Refills: 0 | Status: DISCONTINUED | OUTPATIENT
Start: 2021-01-22 | End: 2021-01-22

## 2021-01-21 RX ORDER — MAGNESIUM HYDROXIDE 400 MG/1
30 TABLET, CHEWABLE ORAL DAILY
Refills: 0 | Status: DISCONTINUED | OUTPATIENT
Start: 2021-01-21 | End: 2021-01-22

## 2021-01-21 RX ORDER — CELECOXIB 200 MG/1
200 CAPSULE ORAL EVERY 12 HOURS
Refills: 0 | Status: DISCONTINUED | OUTPATIENT
Start: 2021-01-22 | End: 2021-01-22

## 2021-01-21 RX ORDER — HYDROMORPHONE HYDROCHLORIDE 2 MG/ML
0.25 INJECTION INTRAMUSCULAR; INTRAVENOUS; SUBCUTANEOUS
Refills: 0 | Status: DISCONTINUED | OUTPATIENT
Start: 2021-01-21 | End: 2021-01-21

## 2021-01-21 RX ORDER — LANOLIN ALCOHOL/MO/W.PET/CERES
3 CREAM (GRAM) TOPICAL AT BEDTIME
Refills: 0 | Status: DISCONTINUED | OUTPATIENT
Start: 2021-01-21 | End: 2021-01-22

## 2021-01-21 RX ORDER — HYDROMORPHONE HYDROCHLORIDE 2 MG/ML
0.5 INJECTION INTRAMUSCULAR; INTRAVENOUS; SUBCUTANEOUS
Refills: 0 | Status: DISCONTINUED | OUTPATIENT
Start: 2021-01-21 | End: 2021-01-21

## 2021-01-21 RX ORDER — ACETAMINOPHEN 500 MG
0 TABLET ORAL
Qty: 0 | Refills: 0 | DISCHARGE

## 2021-01-21 RX ORDER — PANTOPRAZOLE SODIUM 20 MG/1
40 TABLET, DELAYED RELEASE ORAL
Refills: 0 | Status: DISCONTINUED | OUTPATIENT
Start: 2021-01-21 | End: 2021-01-22

## 2021-01-21 RX ORDER — POLYETHYLENE GLYCOL 3350 17 G/17G
17 POWDER, FOR SOLUTION ORAL AT BEDTIME
Refills: 0 | Status: DISCONTINUED | OUTPATIENT
Start: 2021-01-21 | End: 2021-01-22

## 2021-01-21 RX ORDER — BENZOCAINE AND MENTHOL 5; 1 G/100ML; G/100ML
1 LIQUID ORAL EVERY 4 HOURS
Refills: 0 | Status: DISCONTINUED | OUTPATIENT
Start: 2021-01-21 | End: 2021-01-22

## 2021-01-21 RX ORDER — SODIUM CHLORIDE 9 MG/ML
1000 INJECTION, SOLUTION INTRAVENOUS
Refills: 0 | Status: DISCONTINUED | OUTPATIENT
Start: 2021-01-21 | End: 2021-01-21

## 2021-01-21 RX ORDER — ROBINUL 0.2 MG/ML
0.2 INJECTION INTRAMUSCULAR; INTRAVENOUS ONCE
Refills: 0 | Status: COMPLETED | OUTPATIENT
Start: 2021-01-21 | End: 2021-01-21

## 2021-01-21 RX ADMIN — ROBINUL 0.2 MILLIGRAM(S): 0.2 INJECTION INTRAMUSCULAR; INTRAVENOUS at 11:43

## 2021-01-21 RX ADMIN — HYDROMORPHONE HYDROCHLORIDE 0.25 MILLIGRAM(S): 2 INJECTION INTRAMUSCULAR; INTRAVENOUS; SUBCUTANEOUS at 12:35

## 2021-01-21 RX ADMIN — CELECOXIB 200 MILLIGRAM(S): 200 CAPSULE ORAL at 08:35

## 2021-01-21 RX ADMIN — SODIUM CHLORIDE 110 MILLILITER(S): 9 INJECTION, SOLUTION INTRAVENOUS at 11:43

## 2021-01-21 RX ADMIN — Medication 100 MILLIGRAM(S): at 17:39

## 2021-01-21 RX ADMIN — OXYCODONE HYDROCHLORIDE 10 MILLIGRAM(S): 5 TABLET ORAL at 20:38

## 2021-01-21 RX ADMIN — Medication 400 MILLIGRAM(S): at 15:53

## 2021-01-21 RX ADMIN — Medication 650 MILLIGRAM(S): at 08:35

## 2021-01-21 RX ADMIN — Medication 500 MILLIGRAM(S): at 17:39

## 2021-01-21 RX ADMIN — OXYCODONE HYDROCHLORIDE 10 MILLIGRAM(S): 5 TABLET ORAL at 17:39

## 2021-01-21 RX ADMIN — OXYCODONE HYDROCHLORIDE 10 MILLIGRAM(S): 5 TABLET ORAL at 13:44

## 2021-01-21 NOTE — CONSULT NOTE ADULT - SUBJECTIVE AND OBJECTIVE BOX
LARISSA EDWARDS is a 35y Female s/p EXTENSIVE SURGERY    LEFT TOTAL HIP ARTHROPLASTY REVISION      w/ h/o No pertinent past medical history      denies any chest pain shortness of breath palpitation dizziness lightheadedness nausea vomiting fever or chills    History of left hip replacement    History of  section      No pertinent family history in first degree relatives      SH: doesnot smoke or drink at this time    No Known Allergies    ascorbic acid 500 milliGRAM(s) Oral two times a day  benzocaine 15 mG/menthol 3.6 mG (Sugar-Free) Lozenge 1 Lozenge Oral every 4 hours PRN  ceFAZolin   IVPB 2000 milliGRAM(s) IV Intermittent every 8 hours  HYDROmorphone  Injectable 1 milliGRAM(s) IV Push every 4 hours PRN  lactated ringers. 1000 milliLiter(s) IV Continuous <Continuous>  magnesium hydroxide Suspension 30 milliLiter(s) Oral daily PRN  melatonin 3 milliGRAM(s) Oral at bedtime PRN  multivitamin 1 Tablet(s) Oral daily  oxyCODONE    IR 5 milliGRAM(s) Oral every 4 hours  oxyCODONE    IR 5 milliGRAM(s) Oral every 3 hours PRN  oxyCODONE    IR 10 milliGRAM(s) Oral every 3 hours PRN  pantoprazole    Tablet 40 milliGRAM(s) Oral before breakfast  polyethylene glycol 3350 17 Gram(s) Oral at bedtime  senna 2 Tablet(s) Oral at bedtime    T(C): 36.4 (21 @ 17:30), Max: 37.2 (21 @ 07:44)  HR: 60 (21 @ 17:30) (43 - 68)  BP: 118/78 (21 @ 17:30) (97/64 - 144/53)  RR: 18 (21 @ 17:30) (13 - 22)  SpO2: 99% (21 @ 17:30) (97% - 100%)  HEENT unremarkable  neck no JVD or bruit  heart normal S1 S2 RRR no gallops or rubs  chest clear to auscultation  abd sof nontender non distended +bs  ext no calf tenderness    A/P   DVT PX  pain control  bowel regimen   wound care as per ortho  GI PX  antiemetics prn  incentive spirometer

## 2021-01-21 NOTE — OCCUPATIONAL THERAPY INITIAL EVALUATION ADULT - ADDITIONAL COMMENTS
Pre op assessment confirmed - Pt had her L AUGUSTUS done at [facility] on 9/10/20 and went home and had home PT. Pt lives with family in a private house with 3 steps to enter with bilateral handrails that are far apart. Once inside, the patient has 12 steps with a L rail to reach the main floor where the bedroom and bathroom is. The patients bathroom has a tub/shower combination, fixed/retractable shower head, standard toilet seat and grab bars.

## 2021-01-21 NOTE — DISCHARGE NOTE PROVIDER - NSDCMRMEDTOKEN_GEN_ALL_CORE_FT
Tylenol 325 mg oral tablet: orally 2 times a day, As Needed   ascorbic acid 500 mg oral tablet: 1 tab(s) orally 2 times a day  aspirin 81 mg oral delayed release tablet: 1 tab(s) orally 2 times a day MDD:2  celecoxib 200 mg oral capsule: 1 cap(s) orally every 12 hours MDD:2  Multiple Vitamins oral tablet: 1 tab(s) orally once a day  oxyCODONE 10 mg oral tablet: 1 tab(s) orally every 4 hours, As Needed Pain  MDD:6   pantoprazole 40 mg oral delayed release tablet: 1 tab(s) orally once a day (before a meal) MDD:1  polyethylene glycol 3350 oral powder for reconstitution: 17 gram(s) orally once a day (at bedtime)  senna oral tablet: 2 tab(s) orally once a day (at bedtime)  Tylenol 325 mg oral tablet: orally 2 times a day, As Needed

## 2021-01-21 NOTE — PHYSICAL THERAPY INITIAL EVALUATION ADULT - GAIT TRAINING, PT EVAL
Patient will ambulate 500 feet with rolling walker independently for community ambulation in 2-3 days. Patient will ascend/descend 15 steps with L rail up/R rail down sidestepping independently in 2-3 days to safely navigate home environment.

## 2021-01-21 NOTE — DISCHARGE NOTE PROVIDER - NSDCCPTREATMENT_GEN_ALL_CORE_FT
PRINCIPAL PROCEDURE  Procedure: Revision of left total hip arthroplasty by posterior approach  Findings and Treatment:

## 2021-01-21 NOTE — PHYSICAL THERAPY INITIAL EVALUATION ADULT - ACTIVE RANGE OF MOTION EXAMINATION, REHAB EVAL
L hip within posterior precautions/bilateral upper extremity Active ROM was WFL (within functional limits)/bilateral  lower extremity Active ROM was WFL (within functional limits)/deficits as listed below

## 2021-01-21 NOTE — DISCHARGE NOTE PROVIDER - NSDCCPCAREPLAN_GEN_ALL_CORE_FT
PRINCIPAL DISCHARGE DIAGNOSIS  Diagnosis: Hip dislocation, left  Assessment and Plan of Treatment:

## 2021-01-21 NOTE — PHYSICAL THERAPY INITIAL EVALUATION ADULT - TRANSFER SKILLS, REHAB EVAL
Dr Afia Bustamante office called to say that pt will be having surgery on 5/15. States pt will be having hospital pre-op with EKG and labs on 5/2. [facility] pt was seen by Dr Leigha Anderson in March and Dr Leigha Anderson referred pt to Dr Afia Bustamante. They will be faxing over a pre-op clearance form. Would like to know if Dr Leigha Anderson will sign it without pt having to be seen again. Advised that Dr Leigha Anderson is our of the office until Monday. Will have Dr Leigha Anderson look at the form and will let them know if it is something she will do without seeing pt. independent

## 2021-01-21 NOTE — DISCHARGE NOTE PROVIDER - HOSPITAL COURSE
35yFemale with history of Left AUGUSTUS Dislocation presenting for Left AUGUSTUS Revision by Dr. Navarro on 1/21/21. Risk and benefits of surgery were explained to the patient. The patient understood and agreed to proceed with surgery. Patient underwent the procedure with no intraoperative complications. Pt was brought in stable condition to the PACU. Once stable in PACU, pt was brought to the floor. During hospital stay pt was followed by Medicine during this admission. Pt had an uneventful hospital course. Pt is stable for discharge to [rehab/home] on POD#[ ] 35yFemale with history of Left AUGUSTUS Dislocation presenting for Left AUGUSTUS Revision by Dr. Navarro on 1/21/21. Risk and benefits of surgery were explained to the patient. The patient understood and agreed to proceed with surgery. Patient underwent the procedure with no intraoperative complications. Pt was brought in stable condition to the PACU. Once stable in PACU, pt was brought to the floor. During hospital stay pt was followed by Medicine during this admission. Pt had an uneventful hospital course. Pt is stable for discharge to home on POD#1

## 2021-01-21 NOTE — PHYSICAL THERAPY INITIAL EVALUATION ADULT - PERTINENT HX OF CURRENT PROBLEM, REHAB EVAL
Patient with L posterior THR on 9/10/20, with multiple dislocations. Patient admitted s/p L Posterior Total Hip Replacement Revision POD #0.

## 2021-01-21 NOTE — DISCHARGE NOTE PROVIDER - NSDCFUADDINST_GEN_ALL_CORE_FT
Keep Prineo Dressing Clean, Dry and Intact. May shower with Prineo Dressing. Please do not scrub, soak, peel or pick at the prineo dressing. No creams, lotions, or oils over dressing. May shower and let water run over incision, no baths. Pat dry once out of shower. Dressing to be removed in office at follow up visit in 2 weeks.   Keep Prineo Dressing Clean, Dry and Intact. May shower with Prineo Dressing. Please do not scrub, soak, peel or pick at the prineo dressing. No creams, lotions, or oils over dressing. May shower and let water run over incision, no baths. Pat dry once out of shower. Dressing to be removed in office at follow up visit in 2 weeks.    Per Dr. Navarro: may advance from walker as tolerated per discretion of physical therapist.    STRICT Hip replacement precautions: Abduction pillow. Elevate the leg (while keeping hip precautions) as often as possible to help control swelling. Incentive spirometer 10X/hr.

## 2021-01-21 NOTE — DISCHARGE NOTE PROVIDER - CARE PROVIDER_API CALL
Riley Navarro)  Orthopaedic Surgery  [address] [place] [address]  Phone: [phone]  Fax: [phone]  Follow Up Time:

## 2021-01-21 NOTE — OCCUPATIONAL THERAPY INITIAL EVALUATION ADULT - GENERAL OBSERVATIONS, REHAB EVAL
Pt encountered supine in bed, NAD. Pt had no pain s/p left posterior total hip replacement revision.

## 2021-01-21 NOTE — PHYSICAL THERAPY INITIAL EVALUATION ADULT - ADDITIONAL COMMENTS
As per pre-op and reviewed with patient POD #0: Home set-up: lives with  & children in private house with 3 stair steps with x2 handrails (far apart) to enter at front. Bedroom is at 2nd floor with 12 stair steps to negotiate, x1 left handrail. Bathroom at each floor is a shower/tub combination with grab rails, standard height toilet seats, with fixed + retractable shower heads. Endorses will be supported by post-op by . Patient is currently independent with mobility.    **Per patient ~2 weeks after primary LTH Pt caught her left leg on a blanket in bed and had increased pain. Taken to [facility] where found to have torn tendons to L hip and a fractured acetabular component. Sent home & after which dislocated 4 more times, non-traumatic, but required relocation multiple times.

## 2021-01-21 NOTE — BRIEF OPERATIVE NOTE - NSICDXBRIEFPROCEDURE_GEN_ALL_CORE_FT
PROCEDURES:  Revision of left total hip arthroplasty by posterior approach 21-Jan-2021 11:16:40  Jorge Luis Wilson

## 2021-01-22 ENCOUNTER — TRANSCRIPTION ENCOUNTER (OUTPATIENT)
Age: 36
End: 2021-01-22

## 2021-01-22 VITALS
SYSTOLIC BLOOD PRESSURE: 116 MMHG | DIASTOLIC BLOOD PRESSURE: 64 MMHG | RESPIRATION RATE: 16 BRPM | OXYGEN SATURATION: 100 % | TEMPERATURE: 99 F | HEART RATE: 56 BPM

## 2021-01-22 LAB
ANION GAP SERPL CALC-SCNC: 5 MMOL/L — SIGNIFICANT CHANGE UP (ref 5–17)
BUN SERPL-MCNC: 8 MG/DL — SIGNIFICANT CHANGE UP (ref 7–23)
CALCIUM SERPL-MCNC: 8.9 MG/DL — SIGNIFICANT CHANGE UP (ref 8.5–10.1)
CHLORIDE SERPL-SCNC: 105 MMOL/L — SIGNIFICANT CHANGE UP (ref 96–108)
CO2 SERPL-SCNC: 28 MMOL/L — SIGNIFICANT CHANGE UP (ref 22–31)
CREAT SERPL-MCNC: 0.47 MG/DL — LOW (ref 0.5–1.3)
GLUCOSE SERPL-MCNC: 85 MG/DL — SIGNIFICANT CHANGE UP (ref 70–99)
HCT VFR BLD CALC: 27 % — LOW (ref 34.5–45)
HGB BLD-MCNC: 9.7 G/DL — LOW (ref 11.5–15.5)
MCHC RBC-ENTMCNC: 22.6 PG — LOW (ref 27–34)
MCHC RBC-ENTMCNC: 35.9 GM/DL — SIGNIFICANT CHANGE UP (ref 32–36)
MCV RBC AUTO: 62.9 FL — LOW (ref 80–100)
NRBC # BLD: 0 /100 WBCS — SIGNIFICANT CHANGE UP (ref 0–0)
PLATELET # BLD AUTO: 224 K/UL — SIGNIFICANT CHANGE UP (ref 150–400)
POTASSIUM SERPL-MCNC: 3.7 MMOL/L — SIGNIFICANT CHANGE UP (ref 3.5–5.3)
POTASSIUM SERPL-SCNC: 3.7 MMOL/L — SIGNIFICANT CHANGE UP (ref 3.5–5.3)
RBC # BLD: 4.29 M/UL — SIGNIFICANT CHANGE UP (ref 3.8–5.2)
RBC # FLD: 16.8 % — HIGH (ref 10.3–14.5)
SODIUM SERPL-SCNC: 138 MMOL/L — SIGNIFICANT CHANGE UP (ref 135–145)
SURGICAL PATHOLOGY STUDY: SIGNIFICANT CHANGE UP
WBC # BLD: 14.83 K/UL — HIGH (ref 3.8–10.5)
WBC # FLD AUTO: 14.83 K/UL — HIGH (ref 3.8–10.5)

## 2021-01-22 PROCEDURE — 73501 X-RAY EXAM HIP UNI 1 VIEW: CPT | Mod: 26

## 2021-01-22 RX ORDER — ASPIRIN/CALCIUM CARB/MAGNESIUM 324 MG
1 TABLET ORAL
Qty: 60 | Refills: 0
Start: 2021-01-22 | End: 2021-02-20

## 2021-01-22 RX ORDER — HYDROMORPHONE HYDROCHLORIDE 2 MG/ML
0.5 INJECTION INTRAMUSCULAR; INTRAVENOUS; SUBCUTANEOUS EVERY 6 HOURS
Refills: 0 | Status: DISCONTINUED | OUTPATIENT
Start: 2021-01-22 | End: 2021-01-22

## 2021-01-22 RX ORDER — PANTOPRAZOLE SODIUM 20 MG/1
1 TABLET, DELAYED RELEASE ORAL
Qty: 30 | Refills: 0
Start: 2021-01-22 | End: 2021-02-20

## 2021-01-22 RX ORDER — SENNA PLUS 8.6 MG/1
2 TABLET ORAL
Qty: 0 | Refills: 0 | DISCHARGE
Start: 2021-01-22

## 2021-01-22 RX ORDER — ASCORBIC ACID 60 MG
1 TABLET,CHEWABLE ORAL
Qty: 0 | Refills: 0 | DISCHARGE
Start: 2021-01-22

## 2021-01-22 RX ORDER — POLYETHYLENE GLYCOL 3350 17 G/17G
17 POWDER, FOR SOLUTION ORAL
Qty: 0 | Refills: 0 | DISCHARGE
Start: 2021-01-22

## 2021-01-22 RX ORDER — CELECOXIB 200 MG/1
1 CAPSULE ORAL
Qty: 60 | Refills: 0
Start: 2021-01-22 | End: 2021-02-20

## 2021-01-22 RX ORDER — OXYCODONE HYDROCHLORIDE 5 MG/1
1 TABLET ORAL
Qty: 30 | Refills: 0
Start: 2021-01-22 | End: 2021-01-26

## 2021-01-22 RX ADMIN — OXYCODONE HYDROCHLORIDE 5 MILLIGRAM(S): 5 TABLET ORAL at 10:45

## 2021-01-22 RX ADMIN — Medication 100 MILLIGRAM(S): at 00:34

## 2021-01-22 RX ADMIN — CELECOXIB 200 MILLIGRAM(S): 200 CAPSULE ORAL at 17:09

## 2021-01-22 RX ADMIN — Medication 500 MILLIGRAM(S): at 05:33

## 2021-01-22 RX ADMIN — OXYCODONE HYDROCHLORIDE 10 MILLIGRAM(S): 5 TABLET ORAL at 05:33

## 2021-01-22 RX ADMIN — Medication 81 MILLIGRAM(S): at 05:33

## 2021-01-22 RX ADMIN — OXYCODONE HYDROCHLORIDE 10 MILLIGRAM(S): 5 TABLET ORAL at 13:18

## 2021-01-22 RX ADMIN — OXYCODONE HYDROCHLORIDE 10 MILLIGRAM(S): 5 TABLET ORAL at 09:01

## 2021-01-22 RX ADMIN — PANTOPRAZOLE SODIUM 40 MILLIGRAM(S): 20 TABLET, DELAYED RELEASE ORAL at 06:05

## 2021-01-22 RX ADMIN — Medication 81 MILLIGRAM(S): at 17:09

## 2021-01-22 RX ADMIN — OXYCODONE HYDROCHLORIDE 10 MILLIGRAM(S): 5 TABLET ORAL at 00:33

## 2021-01-22 RX ADMIN — Medication 500 MILLIGRAM(S): at 17:09

## 2021-01-22 RX ADMIN — CELECOXIB 200 MILLIGRAM(S): 200 CAPSULE ORAL at 05:33

## 2021-01-22 RX ADMIN — Medication 102 MILLIGRAM(S): at 05:35

## 2021-01-22 RX ADMIN — Medication 1 TABLET(S): at 11:54

## 2021-01-22 RX ADMIN — OXYCODONE HYDROCHLORIDE 10 MILLIGRAM(S): 5 TABLET ORAL at 16:09

## 2021-01-22 NOTE — DISCHARGE NOTE NURSING/CASE MANAGEMENT/SOCIAL WORK - PATIENT PORTAL LINK FT
You can access the FollowMyHealth Patient Portal offered by [facility] by registering at the following website: [email or web address]. By joining [employer]’s FollowMyHealth portal, you will also be able to view your health information using other applications (apps) compatible with our system.
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
## 2021-01-28 DIAGNOSIS — S73.005A UNSPECIFIED DISLOCATION OF LEFT HIP, INITIAL ENCOUNTER: ICD-10-CM

## 2021-01-28 DIAGNOSIS — M25.352 OTHER INSTABILITY, LEFT HIP: ICD-10-CM
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
## 2021-01-28 DIAGNOSIS — M16.12 UNILATERAL PRIMARY OSTEOARTHRITIS, LEFT HIP: ICD-10-CM

## 2021-02-03 LAB
CULTURE RESULTS: SIGNIFICANT CHANGE UP
GRAM STN FLD: SIGNIFICANT CHANGE UP
SPECIMEN SOURCE: SIGNIFICANT CHANGE UP

## 2022-01-03 ENCOUNTER — EMERGENCY (EMERGENCY)
Facility: HOSPITAL | Age: 37
LOS: 0 days | Discharge: HOME | End: 2022-01-04
Attending: EMERGENCY MEDICINE
Payer: COMMERCIAL

## 2022-01-03 VITALS — HEIGHT: 63 IN

## 2022-01-03 DIAGNOSIS — Z98.891 HISTORY OF UTERINE SCAR FROM PREVIOUS SURGERY: Chronic | ICD-10-CM

## 2022-01-03 DIAGNOSIS — Z79.82 LONG TERM (CURRENT) USE OF ASPIRIN: ICD-10-CM

## 2022-01-03 DIAGNOSIS — D25.9 LEIOMYOMA OF UTERUS, UNSPECIFIED: ICD-10-CM

## 2022-01-03 DIAGNOSIS — N92.0 EXCESSIVE AND FREQUENT MENSTRUATION WITH REGULAR CYCLE: ICD-10-CM

## 2022-01-03 DIAGNOSIS — Z96.642 PRESENCE OF LEFT ARTIFICIAL HIP JOINT: Chronic | ICD-10-CM

## 2022-01-03 DIAGNOSIS — N93.9 ABNORMAL UTERINE AND VAGINAL BLEEDING, UNSPECIFIED: ICD-10-CM

## 2022-01-03 DIAGNOSIS — K42.9 UMBILICAL HERNIA WITHOUT OBSTRUCTION OR GANGRENE: ICD-10-CM

## 2022-01-03 LAB
ALBUMIN SERPL ELPH-MCNC: 3.6 G/DL — SIGNIFICANT CHANGE UP (ref 3.3–5)
ALP SERPL-CCNC: 55 U/L — SIGNIFICANT CHANGE UP (ref 40–120)
ALT FLD-CCNC: 39 U/L — SIGNIFICANT CHANGE UP (ref 12–78)
ANION GAP SERPL CALC-SCNC: 4 MMOL/L — LOW (ref 5–17)
APTT BLD: 27.6 SEC — SIGNIFICANT CHANGE UP (ref 27.5–35.5)
AST SERPL-CCNC: 44 U/L — HIGH (ref 15–37)
BASOPHILS # BLD AUTO: 0.13 K/UL — SIGNIFICANT CHANGE UP (ref 0–0.2)
BASOPHILS NFR BLD AUTO: 2 % — SIGNIFICANT CHANGE UP (ref 0–2)
BILIRUB SERPL-MCNC: 1.1 MG/DL — SIGNIFICANT CHANGE UP (ref 0.2–1.2)
BUN SERPL-MCNC: 6 MG/DL — LOW (ref 7–23)
CALCIUM SERPL-MCNC: 9 MG/DL — SIGNIFICANT CHANGE UP (ref 8.5–10.1)
CHLORIDE SERPL-SCNC: 109 MMOL/L — HIGH (ref 96–108)
CO2 SERPL-SCNC: 26 MMOL/L — SIGNIFICANT CHANGE UP (ref 22–31)
CREAT SERPL-MCNC: 0.62 MG/DL — SIGNIFICANT CHANGE UP (ref 0.5–1.3)
EOSINOPHIL # BLD AUTO: 0 K/UL — SIGNIFICANT CHANGE UP (ref 0–0.5)
EOSINOPHIL NFR BLD AUTO: 0 % — SIGNIFICANT CHANGE UP (ref 0–6)
GLUCOSE SERPL-MCNC: 93 MG/DL — SIGNIFICANT CHANGE UP (ref 70–99)
HCG SERPL-ACNC: <1 MIU/ML — SIGNIFICANT CHANGE UP
HCT VFR BLD CALC: 25.9 % — LOW (ref 34.5–45)
HCT VFR BLD CALC: 29.5 % — LOW (ref 34.5–45)
HGB BLD-MCNC: 10.4 G/DL — LOW (ref 11.5–15.5)
HGB BLD-MCNC: 9.1 G/DL — LOW (ref 11.5–15.5)
HIV 1 & 2 AB SERPL IA.RAPID: SIGNIFICANT CHANGE UP
INR BLD: 0.96 RATIO — SIGNIFICANT CHANGE UP (ref 0.88–1.16)
LYMPHOCYTES # BLD AUTO: 1.3 K/UL — SIGNIFICANT CHANGE UP (ref 1–3.3)
LYMPHOCYTES # BLD AUTO: 20 % — SIGNIFICANT CHANGE UP (ref 13–44)
MCHC RBC-ENTMCNC: 24.1 PG — LOW (ref 27–34)
MCHC RBC-ENTMCNC: 35.3 GM/DL — SIGNIFICANT CHANGE UP (ref 32–36)
MCV RBC AUTO: 68.3 FL — LOW (ref 80–100)
MONOCYTES # BLD AUTO: 0.32 K/UL — SIGNIFICANT CHANGE UP (ref 0–0.9)
MONOCYTES NFR BLD AUTO: 5 % — SIGNIFICANT CHANGE UP (ref 2–14)
NEUTROPHILS # BLD AUTO: 4.67 K/UL — SIGNIFICANT CHANGE UP (ref 1.8–7.4)
NEUTROPHILS NFR BLD AUTO: 72 % — SIGNIFICANT CHANGE UP (ref 43–77)
NRBC # BLD: SIGNIFICANT CHANGE UP /100 WBCS (ref 0–0)
PLATELET # BLD AUTO: 203 K/UL — SIGNIFICANT CHANGE UP (ref 150–400)
POTASSIUM SERPL-MCNC: 3.6 MMOL/L — SIGNIFICANT CHANGE UP (ref 3.5–5.3)
POTASSIUM SERPL-SCNC: 3.6 MMOL/L — SIGNIFICANT CHANGE UP (ref 3.5–5.3)
PROT SERPL-MCNC: 7.1 GM/DL — SIGNIFICANT CHANGE UP (ref 6–8.3)
PROTHROM AB SERPL-ACNC: 11.2 SEC — SIGNIFICANT CHANGE UP (ref 10.6–13.6)
RBC # BLD: 4.32 M/UL — SIGNIFICANT CHANGE UP (ref 3.8–5.2)
RBC # FLD: 15.9 % — HIGH (ref 10.3–14.5)
SODIUM SERPL-SCNC: 139 MMOL/L — SIGNIFICANT CHANGE UP (ref 135–145)
WBC # BLD: 6.49 K/UL — SIGNIFICANT CHANGE UP (ref 3.8–10.5)
WBC # FLD AUTO: 6.49 K/UL — SIGNIFICANT CHANGE UP (ref 3.8–10.5)

## 2022-01-03 PROCEDURE — 74177 CT ABD & PELVIS W/CONTRAST: CPT | Mod: ME

## 2022-01-03 PROCEDURE — 0241U: CPT

## 2022-01-03 PROCEDURE — 96374 THER/PROPH/DIAG INJ IV PUSH: CPT

## 2022-01-03 PROCEDURE — 36415 COLL VENOUS BLD VENIPUNCTURE: CPT

## 2022-01-03 PROCEDURE — 99285 EMERGENCY DEPT VISIT HI MDM: CPT

## 2022-01-03 PROCEDURE — 86901 BLOOD TYPING SEROLOGIC RH(D): CPT

## 2022-01-03 PROCEDURE — G1004: CPT

## 2022-01-03 PROCEDURE — 85014 HEMATOCRIT: CPT

## 2022-01-03 PROCEDURE — 84702 CHORIONIC GONADOTROPIN TEST: CPT

## 2022-01-03 PROCEDURE — 85730 THROMBOPLASTIN TIME PARTIAL: CPT

## 2022-01-03 PROCEDURE — 86703 HIV-1/HIV-2 1 RESULT ANTBDY: CPT

## 2022-01-03 PROCEDURE — 76830 TRANSVAGINAL US NON-OB: CPT

## 2022-01-03 PROCEDURE — 86850 RBC ANTIBODY SCREEN: CPT

## 2022-01-03 PROCEDURE — 85025 COMPLETE CBC W/AUTO DIFF WBC: CPT

## 2022-01-03 PROCEDURE — 86900 BLOOD TYPING SEROLOGIC ABO: CPT

## 2022-01-03 PROCEDURE — 80053 COMPREHEN METABOLIC PANEL: CPT

## 2022-01-03 PROCEDURE — 85018 HEMOGLOBIN: CPT

## 2022-01-03 PROCEDURE — 96375 TX/PRO/DX INJ NEW DRUG ADDON: CPT

## 2022-01-03 PROCEDURE — 74177 CT ABD & PELVIS W/CONTRAST: CPT | Mod: 26,ME

## 2022-01-03 PROCEDURE — 85610 PROTHROMBIN TIME: CPT

## 2022-01-03 PROCEDURE — 99285 EMERGENCY DEPT VISIT HI MDM: CPT | Mod: 25

## 2022-01-03 PROCEDURE — 76830 TRANSVAGINAL US NON-OB: CPT | Mod: 26

## 2022-01-03 RX ORDER — SODIUM CHLORIDE 9 MG/ML
500 INJECTION INTRAMUSCULAR; INTRAVENOUS; SUBCUTANEOUS ONCE
Refills: 0 | Status: COMPLETED | OUTPATIENT
Start: 2022-01-03 | End: 2022-01-03

## 2022-01-03 RX ORDER — MORPHINE SULFATE 50 MG/1
2 CAPSULE, EXTENDED RELEASE ORAL ONCE
Refills: 0 | Status: DISCONTINUED | OUTPATIENT
Start: 2022-01-03 | End: 2022-01-03

## 2022-01-03 RX ORDER — KETOROLAC TROMETHAMINE 30 MG/ML
15 SYRINGE (ML) INJECTION ONCE
Refills: 0 | Status: DISCONTINUED | OUTPATIENT
Start: 2022-01-03 | End: 2022-01-03

## 2022-01-03 RX ADMIN — Medication 15 MILLIGRAM(S): at 23:24

## 2022-01-03 RX ADMIN — Medication 15 MILLIGRAM(S): at 23:47

## 2022-01-03 RX ADMIN — MORPHINE SULFATE 2 MILLIGRAM(S): 50 CAPSULE, EXTENDED RELEASE ORAL at 21:15

## 2022-01-03 RX ADMIN — SODIUM CHLORIDE 500 MILLILITER(S): 9 INJECTION INTRAMUSCULAR; INTRAVENOUS; SUBCUTANEOUS at 20:54

## 2022-01-03 RX ADMIN — SODIUM CHLORIDE 500 MILLILITER(S): 9 INJECTION INTRAMUSCULAR; INTRAVENOUS; SUBCUTANEOUS at 21:15

## 2022-01-03 RX ADMIN — MORPHINE SULFATE 2 MILLIGRAM(S): 50 CAPSULE, EXTENDED RELEASE ORAL at 21:14

## 2022-01-03 NOTE — ED STATDOCS - EYES, MLM
clear bilaterally.  Pupils equal, round, and reactive to light. clear bilaterally.  Pupils equal, round, and reactive to light. EOMI. Visual field intact x 4 quadrants.

## 2022-01-03 NOTE — ED STATDOCS - PLAN OF CARE
Gyn cleared patient to go home, CT evidence discussed with surgery, surgery eval is appreciated, patient is comfortable, nontoxic appearing, no lightheadedness, H/H stable in the ED, strict return precautions and follow up with gynecology and PMD

## 2022-01-03 NOTE — CONSULT NOTE ADULT - ASSESSMENT
Ass:  abdominal pain, vaginal bleeding, umbilical hernia  Plan:  As discussed with Dr. Brewer no surgical intervention necessary at this time.  F/U in Dr. Brewer's office as outpatient. Management as per GYN and ED

## 2022-01-03 NOTE — ED STATDOCS - CARE PROVIDER_API CALL
Latricia Knox)  Obstetrics and Gynecology  [address] [place] [address]  Phone: [phone]  Fax: [phone]  Follow Up Time:

## 2022-01-03 NOTE — ED STATDOCS - OBJECTIVE STATEMENT
36 F with a PMH of anemia, sickle cell trait presents to the ED c/o vaginal bleeding with associated lower abd pain and vaginal discharge  x today. reports she has been using pads; go through 2-3 pads in a hour. Denies blood in stool.  Denies possibility of being pregnant. Pt is currently sexually active with her ; no other sexual partners. Denies hx of STD / STI / UTI.  States that earlier she felt SOB. denies hx of vaginal discharge. denies use of OCPs. Denies hx of endometriosis. Notes she has had C sections in the past and also notes hx of miscarriages. States she has had a L hip replacement 1 year ago.  denies hx of sickle cell crisis. denies hx of splenectomy. denies hx of hysterectomy or oophorectomy. reports she has a hx of anemia. requesting to be tested for HIV. NKDA. 36 year old female with PMH of anemia, sickle cell trait presents to the ED c/o vaginal bleeding with associated lower abd pain and vaginal discharge  x today. reports she has been using pads; go through 2-3 pads in a hour. Denies blood in stool.  Denies possibility of being pregnant. Pt is currently sexually active with her ; no other sexual partners. Denies hx of STD / STI / UTI. denies hx of vaginal discharge. denies use of OCPs. Denies hx of endometriosis. Notes she has had C sections in the past and also notes hx of miscarriages. States she has had a L hip replacement 1 year ago.  denies hx of sickle cell crisis. denies hx of splenectomy. denies hx of hysterectomy or oophorectomy. reports she has a hx of anemia. requesting to be tested for HIV. NKDA. No saddle anesthesia, no incontinence of urine or stool, no paresthesias.

## 2022-01-03 NOTE — ED STATDOCS - PHYSICAL EXAMINATION
[facility] PGY3:     pelvic exam: no cmt, no adnexal ttp, blood noted in vaginal vault w/o persistent active bleeding noted [facility] PGY3:     pelvic exam: no cmt, no adnexal ttp, blood noted in vaginal vault w/o persistent active bleeding noted, chaperoned

## 2022-01-03 NOTE — ED STATDOCS - PROGRESS NOTE DETAILS
[facility] PGY3: hgb stable (higher than nl), no vaginal discharge noted. no adnexal ttp/cmt on exam. US notable for small myometrial fibroid otherwise pending ct read. Christopher HENNESSY: Patient updated on the results of the labs and imaging. Patient seen by surgery team on behalf of Dr. Julian Brewer/PA Ms. Lala Vang (surgery consult appreciated). CT showed evidence of possible small bowel loop in the rectus muscle, however since patient has no nausea, vomiting, and surgery evaluated patient in person, and reviewed the imaging, they state no acute intervention patient is safe from their perspective to go home. Spoke with gynecology resident at 2353 who spoke with gynecology attending, they reviewed the imaging and vitals and believe that patient does not require an in person gyn evaluation. Patient's second hemoglobin is now 9.1, however patient with hx of chronic anemia s/p hydration, so will repeat a third hemoglobin to see if any evidence of down trending H/H, consider transfusion. Christopher HENNESSY: Stable H/H at 9.2. Baseline at 9.7. Patient with no active bleeding. Gyn cleared patient. Patient wishes to go home, will return if any worsening or repeat vaginal bleeding. Outpatient follow up with gynecology and strict return precautions if any vaginal bleeding, lightheadedness, or if any other health concerns. Christopher HENNESSY: Stable H/H at 9.2. Baseline at 9.7. Patient with no active bleeding. Gyn cleared patient. asked if in ED exam is needed gyn at the moment is OK with patient going home. Patient wishes to go home, will return if any worsening or repeat vaginal bleeding. Outpatient follow up with gynecology and strict return precautions if any vaginal bleeding, lightheadedness, or if any other health concerns.

## 2022-01-03 NOTE — ED STATDOCS - CLINICAL SUMMARY MEDICAL DECISION MAKING FREE TEXT BOX
CT, ultrasound, labs and reassessment. CT, ultrasound, labs and reassessment.    Christopher HENNESSY: H/H stable, initial drop likely dilutional. CT discussed with surgery cleared to go home, surg saw patient and cleared patient to go home.

## 2022-01-03 NOTE — ED STATDOCS - GASTROINTESTINAL, MLM
abdomen soft and non-distended. Bowel sounds present. diffuse abd tenderness abdomen soft and non-distended. Bowel sounds present. diffuse abd tenderness. BS+

## 2022-01-03 NOTE — CONSULT NOTE ADULT - SUBJECTIVE AND OBJECTIVE BOX
Patient is a 36y old  Female who presents with a chief complaint of lower abdominal pain and vaginal bleeding since this afternoon    HPI:  The pt is a 36 F with a PMH of anemia, sickle cell trait presents to the ED c/o vaginal bleeding with associated lower abd pain and vaginal discharge x today. The pt denies nausea, vomiting, constipation or diarrhea.  Denies birth control pills, cannot remember date of last period but claims she is not due for her period.  Pt reports she has been using pads; go through 2-3 pads in a hour. Denies blood in stool.  Denies possibility of being pregnant. Pt is currently sexually active with her ; no other sexual partners. Denies hx of STD / STI / UTI.  States that earlier she felt SOB. denies hx of vaginal discharge. denies use of OCPs. Denies hx of endometriosis. Notes she has had C sections in the past and also notes hx of miscarriages. States she has had a L hip replacement 1 year ago.  denies hx of sickle cell crisis. denies hx of splenectomy. denies hx of hysterectomy or oophorectomy. reports she has a hx of anemia. requesting to be tested for HIV. NKDA.    PAST MEDICAL & SURGICAL HISTORY:  History of  section  /    History of left hip replacement  2020    PHYSICAL EXAM:  T(C): 36.9 (22 @ 21:36), Max: 36.9 (22 @ 19:24)  HR: 60 (22 @ 21:36) (60 - 64)  BP: 122/55 (22 @ 21:36) (105/55 - 122/55)  RR: 12 (22 @ 21:36) (12 - 18)  SpO2: 98% (22 @ 21:36) (98% - 99%)  Wt(kg): --    Gen:  Pt is awake and alert with continued complaint of bilateral lower abdominal pain and vaginal bleeding    Skin:  warm and dry    HEENT:  NC/AT, PERRL, no otorrhea, nares patent, buccal mucosa pink and moist    Neck: trachea midline, no JVD    Respiratory: Lungs clear and equal bilat, no r/r/w    Cardiovascular:  S1S2 reg, no M    Gastrointestinal:  non-distended, +NABS, soft, +tenderness bilateral lower quadrants, no guarding, no rebound, no hernia appreciated    Extremities:  no c/c/e, no calf tenderness    Neurological:  A & O X3, CNII-XII grossly intact, motor and sensory intact and equal bilat                            10.4   6.49  )-----------( 203      ( 2022 19:45 )             29.5           139  |  109<H>  |  6<L>  ----------------------------<  93  3.6   |  26  |  0.62    Ca    9.0      2022 19:45    TPro  7.1  /  Alb  3.6  /  TBili  1.1  /  DBili  x   /  AST  44<H>  /  ALT  39  /  AlkPhos  55      < from: CT Abdomen and Pelvis w/ IV Cont (22 @ 21:29) >  ACC: [id number] EXAM:  CT ABDOMEN AND PELVIS IC                          PROCEDURE DATE:  2022      REPRODUCTIVE ORGANS: Small uterine fibroid. Involuting left ovarian   corpus luteum measuring 1.1 cm.    BOWEL: Short segment of small bowel interposed between the rectus   abdominis musculature with mild upstream dilated small bowel loops. No   bowel wall thickening. Nonspecific mucosal fat deposition in the colonic   walls. Appendix is normal.    PERITONEUM: No ascites, pneumoperitoneum, or loculated collection. No   mesenteric edema. No mesenteric lymphadenopathy.    VESSELS: Within normal limits.    RETROPERITONEUM/LYMPH NODES: No lymphadenopathy.    ABDOMINAL WALL: Postsurgical changes. Tiny fat-containing umbilical   hernia.    IMPRESSION:  No evidence of bowel inflammation. Short segment of small bowel   interposed between the rectus abdominis musculature with mild upstream   dilated small bowel loops which could represent early/partial small bowel   obstruction.

## 2022-01-03 NOTE — ED STATDOCS - CONSTITUTIONAL, MLM
well appearing and in no apparent distress. normal... well appearing and in no apparent distress. nontoxic appearing. AAOx4

## 2022-01-03 NOTE — ED STATDOCS - NSFOLLOWUPINSTRUCTIONS_ED_ALL_ED_FT
Please return to us immediately if you have any repeat or worsening vaginal bleeding, lightheadedness, chest pain, shortness of breath, palpitation or if any other health concerns. Please note that I have discussed the results of your labs and imaging with you. I have provided you with copies of your labs and imaging in hard copies. As noted we saw an abnormalities in the CAT scan of the abdomen and we consulted surgery who states that there is no acute intervention, we also trended your blood count and noted that you are not stable with no decrease of your blood count. Please note that you need to see a gynecologist as soon as possible and if you do not have your own then contact the number provided for you here for a gynecologist.     For all other health concerns, return to us immediately. If you can not make an appointment with your gynecologist in the next two days, if repeat bleeding or if any other health concerns return ot us immediately.    ______________      Abnormal Uterine Bleeding       Abnormal uterine bleeding is unusual bleeding from the uterus. It includes bleeding after sex, or bleeding or spotting between menstrual periods. It may also include bleeding that is heavier than normal, menstrual periods that last longer than usual, or bleeding that occurs after menopause.  Abnormal uterine bleeding can affect teenagers, women in their reproductive years, pregnant women, and women who have reached menopause. Common causes of abnormal uterine bleeding include:  •Pregnancy.      •Growths of tissue (polyps).      •Benign tumors or growths in the uterus (fibroids). These are not cancer.      •Infection.      •Cancer.      •Too much or too little of some hormones in the body (hormonal imbalances).      Any type of abnormal bleeding should be checked by a health care provider. Many cases are minor and simple to treat, but others may be more serious. Treatment will depend on the cause and severity of the bleeding.      Follow these instructions at home:    Medicines     •Take over-the-counter and prescription medicines only as told by your health care provider.      •Tell your health care provider about other medicines that you take. You may be asked to stop taking aspirin or medicines that contain aspirin. These medicines can make bleeding worse.      •If you were prescribed iron pills, take them as told by your health care provider. Iron pills help to replace iron that your body loses because of this condition.      Managing constipation   In cases of severe bleeding, you may be asked to increase your iron intake to treat anemia. This may cause constipation. To prevent or treat constipation, you may need to:  •Drink enough fluid to keep your urine pale yellow.      •Take over-the-counter or prescription medicines.      •Eat foods that are high in fiber, such as beans, whole grains, and fresh fruits and vegetables.      •Limit foods that are high in fat and processed sugars, such as fried or sweet foods.      General instructions    •Monitor your condition for any changes.      •Do not use tampons, douche, or have sex until your health care provider says these things are okay.      •Change your pads often.    •Get regular exams. This includes pelvic exams and cervical cancer screenings.  •It is up to you to get the results of any tests that are done. Ask your health care provider, or the department that is doing the tests, when your results will be ready.        •Keep all follow-up visits as told by your health care provider. This is important.        Contact a health care provider if you:    •Have bleeding that lasts for more than 1 week.      •Feel dizzy at times.      •Feel nauseous or you vomit.      •Feel light-headed or weak.      •Notice any other changes that show that your condition is getting worse.        Get help right away if you:    •Pass out.      •Have bleeding that soaks through a pad every hour.      •Have pain in the abdomen.      •Have a fever or chills.      •Become sweaty or weak.      •Pass large blood clots from your vagina.        Summary    •Abnormal uterine bleeding is unusual bleeding from the uterus.      •Any type of abnormal bleeding should be evaluated by a health care provider. Many cases are minor and simple to treat, but others may be more serious.      •Treatment will depend on the cause of the bleeding.      •Get help right away if you pass out, you have bleeding that soaks through a pad every hour, or you pass large blood clots from your vagina.      This information is not intended to replace advice given to you by your health care provider. Make sure you discuss any questions you have with your health care provider.    ___________

## 2022-01-03 NOTE — ED STATDOCS - ATTENDING CONTRIBUTION TO CARE
I Jesus White MD saw and examined the patient. Resident physician saw and examined the patient under my supervision and I was involved in key steps in care of this patient. I discussed the care of the patient with resident and agree with resident's plan, assessment and care of the patient while in the ED.

## 2022-01-03 NOTE — ED STATDOCS - NSFOLLOWUPCLINICS_GEN_ALL_ED_FT
[facility]  Family Medicine  [address] [place] [address]  Phone: [phone]  Fax:

## 2022-01-03 NOTE — ED STATDOCS - PATIENT PORTAL LINK FT
You can access the FollowMyHealth Patient Portal offered by [facility] by registering at the following website: [email or web address]. By joining [employer]’s FollowMyHealth portal, you will also be able to view your health information using other applications (apps) compatible with our system.

## 2022-01-03 NOTE — ED STATDOCS - CARE PLAN
1 Principal Discharge DX:	Menorrhagia   Principal Discharge DX:	Menorrhagia  Secondary Diagnosis:	Uterine fibroid   Principal Discharge DX:	Menorrhagia  Goal:	Gyn cleared patient to go home, CT evidence discussed with surgery, surgery eval is appreciated, patient is comfortable, nontoxic appearing, no lightheadedness, H/H stable in the ED, strict return precautions and follow up with gynecology and PMD  Secondary Diagnosis:	Uterine fibroid

## 2022-01-04 VITALS
SYSTOLIC BLOOD PRESSURE: 115 MMHG | HEART RATE: 52 BPM | RESPIRATION RATE: 14 BRPM | DIASTOLIC BLOOD PRESSURE: 55 MMHG | OXYGEN SATURATION: 99 %

## 2022-01-04 LAB
FLUAV AG NPH QL: SIGNIFICANT CHANGE UP
FLUBV AG NPH QL: SIGNIFICANT CHANGE UP
HCT VFR BLD CALC: 25.5 % — LOW (ref 34.5–45)
HGB BLD-MCNC: 9.2 G/DL — LOW (ref 11.5–15.5)
RSV RNA NPH QL NAA+NON-PROBE: SIGNIFICANT CHANGE UP
SARS-COV-2 RNA SPEC QL NAA+PROBE: SIGNIFICANT CHANGE UP

## 2022-01-04 NOTE — ED ADULT NURSE REASSESSMENT NOTE - NS ED NURSE REASSESS COMMENT FT1
IV removed without incident. Pt verbalized understanding of d/c instructions and follow up  Ambulated with steady gait on D/C

## 2022-01-11 NOTE — ASU PREOP CHECKLIST - HEIGHT IN FEET
normal appearance , without tenderness upon palpation , no deformities , trachea midline , Thyroid normal size , no thyroid nodules , no masses , no JVD , thyroid nontender
5

## 2022-04-29 NOTE — ED ADULT TRIAGE NOTE - STATUS:
Patient called the [facility]. stating that she was experiencing problems with submitting her verification information to [employer] for her SNAP application.  provided the patient with the address and hours of operation for [facility] at their new location. Patient was very appreciative for the information.     Plan of Care  [facility] will follow up with patient regarding the submittal Applied

## 2022-05-04 PROBLEM — D57.3 SICKLE-CELL TRAIT: Chronic | Status: ACTIVE | Noted: 2022-01-16

## 2022-05-04 PROBLEM — D64.9 ANEMIA, UNSPECIFIED: Chronic | Status: ACTIVE | Noted: 2022-01-16

## 2022-06-07 ENCOUNTER — APPOINTMENT (OUTPATIENT)
Dept: ORTHOPEDIC SURGERY | Facility: CLINIC | Age: 37
End: 2022-06-07

## 2022-07-06 NOTE — DISCHARGE NOTE PROVIDER - NSDCQMSTROKE_NEU_ALL_CORE
Physical Therapy Visit    Referred by: Bree Colón DO; Medical Diagnosis (from order):    Diagnosis Information      Diagnosis    332.0 (ICD-9-CM) - G20 (ICD-10-CM) - Parkinson's disease (CMS/[facility])              Visit: 2      Daily Treatment Note    SUBJECTIVE                                                                                                                 Exercises are going OK - patient not \"dedicated\" just yet but performing exercises 1-2x/day. Patient finding that flicks are helping. Patient has gone to the health club 1x  With emphasis on trunk rotation, trunk extension, and lat pull downs.and trunk stretch. Patient walks around the track. It takes patient 45 minutes.       Patient diagnosed with PD on 6/20/22 after a year long hx of left upper extremity tremor, decreased hand deterity, and shuffling gait pattern. Patient is a . He doesn't have the stamina he used to have. Patient likes to golf and play cards. He has income properties.  Patient has a membership at the health club - \"I do the machines - upper extremity and chest exercises, and gastroc strengthening.  Patient is right handed.  Stairs are difficult due to right knee pain and carrying items down stairs is difficult.  Pain / Symptoms:  Pain rating (out of 10): Current: 0 ; Best: 0; Worst: 5       OBJECTIVE                                                                                                                          TREATMENT                                                                                                                  Therapeutic Exercise:  Pec stretch x 30 sec x 3 bilaterally  Standing against the wall with cuing for head alignment with shoulder ER    Neuromuscular Re-Education:  Training for bilateral heel strike and left arm swing  LSVT Ex 1 and 2  Sit <> stand - cuing to attempt from low surfaces  Assessment(comment on verbal cueing, use of adaptations, modeling, amplitude of movement and  calibration)  Maximal Daily Exercises:  Exercise 1 Performed in sitting and standing with cues on increasing flicks  Exercise 2 Performed in standing  Exercise 3 Over 4\" obstacle with increased difficulty stepping with the left lower extremity  Exercise 4 Over 4 \" obstacle with cuing for increased hip ER  Exercise 5 Step back off a compliant surface with cuing for alignment   Exercise 6 Rock n Reach with deliberate performance and cuing for sequencing  Exercise 7 Cuing for sequencing  Sit <> stand from 2\" compliant surface with cuing for increased anterior weight shift  Functional Component Tasks:  Step ups x 10 with opposite leg hip flexion with upper extremity elevated  Braiding right and left with slow speed  Bed mobility: with cuing for increased amplitude  Walking on uneven surfaces: on off compliant surface.      BIG walking:   Cuing for increased arm swing and heel strike    Daily Carryover Assignments: Sit <> stand from his lounge chair without upper extremity support      Pt will benefit from LSVT BIG intensive amplitude based treatment based on principles of neuroplasticity, 4x/week for 4 weeks dosage with goal of re calibration of kinesthetic and proprioceptive movement awareness to decrease bradykinesia, hypokinesia and akinesia all impacting safety and functional activity completion for this pt.       Skilled input: verbal instruction/cues    Writer verbally educated and received verbal consent for hand placement, positioning of patient, and techniques to be performed today from patient for hand placement and palpation for techniques as described above and how they are pertinent to the patient's plan of care.    Home Exercise Program/Education Materials: Heel strike walking  LSVT Ex 1 and 2  Sit <> stand    Access Code: [email or web address]  URL: [email or web address]  Date: 07/11/2022  Prepared by: Ember Ch    Exercises  · Standing Pec Stretch at Wall - 3 x daily - 5 x weekly - 3 sets  - 10 reps - 5 hold  · Neutral Posture in Standing - 2 x daily - 5 x weekly - 1 sets - 1 reps - 1 min hold  · Braided Sidestepping - 2 x daily - 5 x weekly - 1 sets - 4 reps  · Sit to Stand with Arm Swing - 2 x daily - 5 x weekly - 2 sets - 5 reps - 1 hold  · Runner's Step up with Arms Forward - 2 x daily - 5 x weekly - 2 sets - 10 reps                  ASSESSMENT                                                                                                             Patient demonstrating improved understanding of upright posture with standing. Patient noted to have increased balance difficulty standing on the right lower extremity.  Patient noted to have difficulty with dynamic balance activities.  Patient/Caregiver Education:   Results of above outlined education: Verbalizes understanding and Demonstrates understanding    PLAN                                                                                                                           Suggestions for next session as indicated: Progress per plan of care, Progress BIG program, give LSVT book            Therapy procedure time and total treatment time can be found documented on the Time Entry flowsheet.   No

## 2022-08-10 NOTE — ED STATDOCS - SKIN [+], MLM
BRUISING
22M pmh two shoulder dislocations p/w R shoulder pain after reaching out with RUE while getting out of a hot tub. Now w/shoulder deformity & pain. No focal numbness or weakness.    PE:  nad  skin warm, dry  ncat  neck supple  rrr nl s1s2 no mrg  ctab no wrr  abd soft ntnd no palpable masses no rgr  back non-tender no cvat  ext- +R shoulder deformity, unable to range, remainder of RUE rom/strength/sensation intact, good , dpi, cr<2s; remainder of ext exam nml  neuro aaox3 grossly nf exam

## 2023-01-05 NOTE — ED PROVIDER NOTE - PRINCIPAL DIAGNOSIS
[de-identified] : Right - hearing WNL\par Left - mild to moderate sensorineural hearing loss\par Impedance testing reveals normal Type A tympanograms bilaterally\par  Dysmenorrhea

## 2023-03-02 NOTE — H&P PST ADULT - HOW PATIENT ADDRESSED, PROFILE
[facility] OR Dental Procedure Note    Treatment Completed   General Anesthesia Start:  Jenn William was brought into the operating room and draped in the usual customary [facility] fashion. Following the time-out procedures, the patient was placed under General Anesthesia Care via Right naris.    Under general anesthesia, the following operations were performed in the mouth:   Bilateral dental examination,   FMX radiographs were taken and reviewed.  Moist throat pack was placed, betadine applied circumferentially to oral cavity.   Pre-procedural rinse included: Chlorhexidine 0.12% solution followed by a 50/50 solution of sterile saline and Hydrogen Peroxide.  Periodontal Treatment: Full mouth prophylaxis: Bulk debridement completed with the Cavitron, followed use of hand scalers as necessary. Slow speed polish with medium grit polishing paste. All contacts flossed.        Composite Restoration(s):  Tooth #9-upper left central incisor, #10--upper left lateral incisor , #11--upper left canine , #20-lower left 2nd premolar, #21-lower left 1st premolar, #22-lower left canine and #23-lower left lateral incisor prepared with the electric hand pieces and hand instruments as necessary to remove decay and ensure retention of restoration, preparation cleansed with 38% Phosphoric etch, followed by two applications of 3M Scotchbond Universal adhesive, and restored with 3M Filtek composite shade A2, all materials used per product instructions.  #9 DL, #10MILF, #11MBD, #20OB, #21F, #22DLF, #23DL.    Local Anesthesia: none used.    Sodium Fluoride Varnish 5% placed on remaining teeth.     Throat pack placed at: 0827  Throat pack removed at: 0958  The oropharynx was inspected and found to be clear.   Estimated blood loss: 3 (mL)      Dental procedure Finish:  After the dental procedure, the patient was transferred to recovery room in good condition.The patient s family was informed by the dental team about the  dental findings and procedures performed. Verbal and written post-op instructions given. All questions and concerns were invited and answered, patient and patient's family left pleased with treatment.       Clinic contact information:   [facility]  [address], [place] [address] [phone]  ([facility] Professional bldg.)     Pre - Procedure   Patient name: Jenn William  : 1978  Medical record number:  [id number]  Dental procedures performed on: 3/2/2023  It was deemed necessary for this patient to be seen in the hospital operating room because pt is not able to be seen in clinic due to: Developmental Delays    Pre-procedure with patient & guardian:   Consent: Risks complications including but not limited to post-operative pain, swelling, bleeding, infection, temporary/permanent paresthesia/anesthesia of CN V3, lingual nerve, failure to resolve chief complaint. Patient /'s guardian agrees to procedure as written and signed consent after all questions were invited and answered.    Providers     Dental attending:Shaq Abreu DDS, [facility]   Dental resident Marsha Soliz MD, LETYS, PGY-1  Dental resident: Ryan Hyman DMD, PGY-1  Anesthesiologist:Jennifer Nieves MD  CRNA:Se Landry APRN CRNA      Patient review   Patient Health history: History is obtained from the patient and electronic health record  The 10 point Review of Systems is negative other than noted in the HPI and pertinent information mentioned above.     History of Present Illness:  Jenn William is a 45 year old female who presents to the OR for comprehensive dental treatment.     ASA 3 - Patient with moderate systemic disease with functional limitations    Physical Exam:  Vitals were reviewed  Temp: 97.3  F (36.3  C) Temp src: Temporal BP: 130/78 Pulse: 103   Resp: 20 SpO2: 98 % O2 Device: None (Room air)      Medical, Surgical, Social History        Past Medical History:   Diagnosis Date     Decreased vision      Developmental non-verbal disorder      Generalized anxiety disorder      History of self injurious behavior      Spastic quadriplegic cerebral palsy (H)      Tinea versicolor      Unspecified delay in development(315.9)      Wheelchair bound          Past Surgical History:   Procedure Laterality Date     EXAM UNDER ANESTHESIA, RESTORATIONS, EXTRACTION(S) DENTAL COMPLEX, COMBINED N/A 9/18/2017    Procedure: COMBINED EXAM UNDER ANESTHESIA, RESTORATIONS, EXTRACTION(S) DENTAL COMPLEX;  Dental Exam, Restorations x6, Radiographs, Extractions, x1, Periodontal Therapy, flouride varnish in mouth;  Surgeon: Darci Wallis DDS;  Location: [facility] OR     EXAM UNDER ANESTHESIA, RESTORATIONS, EXTRACTION(S) DENTAL COMPLEX, COMBINED N/A 11/21/2019    Procedure: Dental Exam, Radiographs, Four Dental Restorations, Periodontal Therapy,  and Fluoride Treatment;  Surgeon: Cari Mishra DDS;  Location: [facility] OR     heel cord extension       ORTHOPEDIC SURGERY           Social History     Tobacco Use     Smoking status: Never     Smokeless tobacco: Never   Substance Use Topics     Alcohol use: No         History reviewed. No pertinent family history.      Immunizations and Allergies     Immunization History   Administered Date(s) Administered     COVID-19 Vaccine 18+ (Moderna) 04/24/2021, 05/22/2021, 12/17/2021         Allergies   Allergen Reactions     Septra [Sulfamethoxazole W/Trimethoprim] Hives         Medication     Prior to Admission Medications       Current Facility-Administered Medications Ordered in Epic   Medication Dose Route Frequency Last Rate Last Admin     dexamethasone (DECADRON) injection   Intravenous PRN   8 mg at 03/02/23 0800     dexmedetomidine (PRECEDEX) 4 mcg/mL infusion   Intravenous PRN   12 mcg at 03/02/23 0805     fentaNYL (PF) (SUBLIMAZE) injection   Intravenous PRN   25 mcg at 03/02/23 0747     glycopyrrolate (ROBINUL) injection    Intravenous PRN   0.2 mg at 03/02/23 0747     ketorolac (TORADOL) injection   Intravenous PRN   15 mg at 03/02/23 0958     lactated ringers infusion   Intravenous Continuous PRN   New Bag at 03/02/23 0747     lidocaine 2% injection (MDV)   Intravenous PRN   40 mg at 03/02/23 0747     ondansetron (ZOFRAN) injection   Intravenous PRN   4 mg at 03/02/23 0959     phenylephrine (RONEN-SYNEPHRINE) injection   Intravenous Continuous PRN   50 mcg at 03/02/23 0901     propofol (DIPRIVAN) injection 10 mg/mL vial   Intravenous PRN   30 mg at 03/02/23 0920     No current [facility]-ordered outpatient medications on file.         Exam and Assessment    Jenn William 1978 presented to the OR at [facility] due to Pre-procedure diagnosis: Chronic gingivitis; plaque induced, K05.10 and Dental caries, unspecified, K02.9     Extra-Oral:  No submandibular lymphadenopathy, no induration or erythema, no abnormal skin lesions, inferior border of mandible is palpable bilaterally, MADAI >45mm. No physical impediment from TMJ bilaterally. No clicking of TMJ on opening and lateral movements.    Intraoral exam: Reveals heavy plaque, moderate calculus, moderate bleeding on probing, clinical decay seen on #9-upper left central incisor, #10--upper left lateral incisor , #11--upper left canine , #20-lower left 2nd premolar, #21-lower left 1st premolar, #22-lower left canine and #23-lower left lateral incisor, decayed, poor dentition. Mallampati Class II (visualization of the soft palate, fauces, and uvula). Occlusal wear. Small gingiva opening on mandibular buccal of #24.     Probing depth range (mm):  Upper right: 2-4  Upper left: 2-4  Lower left: 2-4  Lower right: 2-4    Radiographic exam: Radiographs revealed PDL widening on #24, similar to previous FMX taken from 2019. Coronal radiolucencies consistent with decay on teeth ,  radio-opacities on teeth consistent with restorations on teeth,  mild RBL noted   Abnormal findings include:  gingiva abrasion on buccal of #24    The post-operative diagnosis was Same as pre-operative diagnosis            Myesha

## 2023-04-05 NOTE — ED ADULT NURSE NOTE - NSFALLRSKINDICTYPE_ED_ALL_ED
Drainage catheter removed. Sterile dressing to site. No evidence of bleeding or hematoma present.    Need for Mobility Assisted Device

## 2023-05-18 NOTE — PHYSICAL THERAPY INITIAL EVALUATION ADULT - IMPAIRMENTS CONTRIBUTING IMPAIRED BED MOBILITY, REHAB EVAL
Patient returned call. States still with pain and would like to give a urine specimen. Orders placed. Patient will go to outpatient lab at [facility] [place]. It is much closer for her.  Will monitor for results pain/impaired postural control/decreased strength/decreased ROM

## 2023-08-07 NOTE — REVIEW OF SYSTEMS
"Encounter Date: 8/7/2023       History     Chief Complaint   Patient presents with    Shortness of Breath     Reports wheezing and raspy voice    Urinary Retention     Decrease in urination since this morning     Mr Haja Marti is a 78 year old male with PMH of LBD w/ Parkinsonism, urinary incontinence s/p bladder stimulator, HTN, HLD, and kidney stones that presents with a 1 day history of episodic wheezing and decreased urination. His wife is at bedside and provides history. She reports that yesterday the patient complained of "not feeling well," and she noticed him wheezing. She denies the patient seeming short of breath at the time, and the wheezing resolved within 30 minutes. He had a self-resolving episode of wheezing the morning of presentation as well. He wears adult diapers at baseline, and his wife also noted that he had less urine in his diaper than usual this morning. He has passed a small amount of urine this morning and did not complain of pain.         Review of patient's allergies indicates:  No Known Allergies  Past Medical History:   Diagnosis Date    Anxiety     Arthritis     BPH (benign prostatic hypertrophy)     Cataract     OS    Colon polyp 2002    Depression     Epiretinal membrane, both eyes     Hx of psychiatric care     Hyperlipidemia     Hypertension     Kidney stones     Kidney stones     Parkinsons     Posterior vitreous detachment of left eye     Psychiatric problem     Psychosis 7/21/2023    Retinal detachment 2004    OD    Retinoschisis, left eye     Sleep apnea     Vitreous hemorrhage of left eye      Past Surgical History:   Procedure Laterality Date    BIOPSY OF ADENOIDS      CATARACT EXTRACTION      OD    COLONOSCOPY N/A 12/3/2019    Procedure: COLONOSCOPY;  Surgeon: Ceferino Oliveira MD;  Location: [address];  Service: Endoscopy;  Laterality: N/A;  5/2019 laminectomy with post op confusion tb    CYSTOSCOPY WITH URODYNAMIC TESTING N/A 1/31/2022    Procedure: CYSTOSCOPY, " WITH URODYNAMIC TESTING FLOUROSCOPIC;  Surgeon: Devang Butler MD;  Location: [facility] OR 1ST FLR;  Service: Urology;  Laterality: N/A;  1hr    EPIDURAL STEROID INJECTION N/A 3/12/2019    Procedure: INJECTION, STEROID, EPIDURAL, L5-S1;  Surgeon: Laina Crockett MD;  Location: [facility] PAIN MGT;  Service: Pain Management;  Laterality: N/A;    EXAMINATION UNDER ANESTHESIA N/A 2/18/2021    Procedure: Exam under anesthesia;  Surgeon: West Clifton MD;  Location: [facility] OR 2ND FLR;  Service: Colon and Rectal;  Laterality: N/A;    EYE SURGERY      INCISION OF PERIRECTAL ABSCESS N/A 2/12/2021    Procedure: INCISION AND DRAINAGE  ABSCESS, PERIRECTAL;  Surgeon: West Clifton MD;  Location: [facility] OR 2ND FLR;  Service: Colon and Rectal;  Laterality: N/A;    INCISION OF PERIRECTAL ABSCESS Left 2/18/2021    Procedure: INCISION, ABSCESS, PERIRECTAL;  Surgeon: West Clifton MD;  Location: [facility] OR 2ND FLR;  Service: Colon and Rectal;  Laterality: Left;    INJECTION OF ANESTHETIC AGENT AROUND NERVE Bilateral 7/3/2018    Procedure: BLOCK, NERVE;  Surgeon: Laina Crockett MD;  Location: [facility] PAIN MGT;  Service: Pain Management;  Laterality: Bilateral;  Bilateral Lumbar L2,L3,L4,L5 MBB      NEEDS CONSENT    INSERTION OF SETON STITCH N/A 2/12/2021    Procedure: PLACEMENT, SETON STITCH;  Surgeon: West Clifton MD;  Location: [facility] OR 2ND FLR;  Service: Colon and Rectal;  Laterality: N/A;    INSERTION OF SETON STITCH Left 2/18/2021    Procedure: PLACEMENT, SETON;  Surgeon: West Clifton MD;  Location: [facility] OR 2ND FLR;  Service: Colon and Rectal;  Laterality: Left;    INSERTION OF SETON STITCH N/A 9/24/2021    Procedure: PLACEMENT, SETON STITCH;  Surgeon: West Clifton MD;  Location: [facility] OR 2ND FLR;  Service: Colon and Rectal;  Laterality: N/A;  seton exchange    laser indirect  4/8/10    left eye    Laser Indirect Retinopexy  4/8/10    OS    PROSTATE SURGERY      RECTAL FISTULOTOMY N/A 11/12/2021     Procedure: FISTULOTOMY, RECTUM;  Surgeon: West Clifton MD;  [facility];  Service: Colon and Rectal;  Laterality: N/A;    RETINAL DETACHMENT SURGERY  2004    OD    ROTATOR CUFF REPAIR  11/13/2013    TONSILLECTOMY      TRANSFORAMINAL EPIDURAL INJECTION OF STEROID Bilateral 2/19/2019    Procedure: INJECTION, STEROID, EPIDURAL, TRANSFORAMINAL APPROACH, L5;  Surgeon: Laina Crockett MD;  Location: [facility] [place] PAIN MGT;  Service: Pain Management;  Laterality: Bilateral;     Family History   Problem Relation Age of Onset    Cataracts Father     Cancer Father         lung    Diabetes Father     Cancer Mother         lung    Anxiety disorder Son     Depression Son     Anxiety disorder Daughter      Social History     Tobacco Use    Smoking status: Never    Smokeless tobacco: Never   Substance Use Topics    Alcohol use: No    Drug use: No     Review of Systems    Physical Exam     Initial Vitals [08/07/23 1356]   BP Pulse Resp Temp SpO2   (!) 184/88 (!) 44 16 99.3 °F (37.4 °C) 98 %      MAP       --         Physical Exam    Constitutional: He appears well-developed and well-nourished. No distress.   HENT:   Head: Normocephalic and atraumatic.   Eyes: EOM are normal. Pupils are equal, round, and reactive to light.   Cardiovascular:  Normal rate, regular rhythm, normal heart sounds and intact distal pulses.           Pulmonary/Chest: Breath sounds normal. No respiratory distress.   Abdominal: Abdomen is soft. Bowel sounds are normal. He exhibits no distension and no mass. There is no abdominal tenderness. There is no guarding.   Musculoskeletal:         General: Normal range of motion.     Neurological:   Alert and oriented x2. Deficits in concentration throughout interview.  No other gross neurological deficits   Skin: Capillary refill takes less than 2 seconds.   Psychiatric:   Visual hallucinations during exam         ED Course   Procedures  Labs Reviewed   CBC W/ AUTO DIFFERENTIAL - Abnormal; Notable for  the following components:       Result Value    RBC 4.42 (*)     MCH 31.7 (*)     Lymph # 0.6 (*)     Gran % 79.7 (*)     Lymph % 10.3 (*)     All other components within normal limits    Narrative:     add on mg per Nicholas Avelar MD order# [id number] 08/07/2023 @ 15:29    COMPREHENSIVE METABOLIC PANEL - Abnormal; Notable for the following components:    CO2 22 (*)     ALT 45 (*)     All other components within normal limits    Narrative:     add on mg per Nicholas Avelar MD order# [id number] 08/07/2023 @ 15:29    URINALYSIS, REFLEX TO URINE CULTURE - Abnormal; Notable for the following components:    Protein, UA Trace (*)     All other components within normal limits    Narrative:     Specimen Source->Urine   TROPONIN I - Abnormal; Notable for the following components:    Troponin I 0.042 (*)     All other components within normal limits    Narrative:     add on mg per Nicholas Avelar MD order# [id number] 08/07/2023 @ 15:29     add on troponin & tsh per Bowen Sepulveda DO order# [id number]   [id number] 08/07/2023 @ 16:18    MAGNESIUM   MAGNESIUM    Narrative:     add on mg per Nicholas Avelar MD order# [id number] 08/07/2023 @ 15:29    TROPONIN I   B-TYPE NATRIURETIC PEPTIDE   TSH   TSH    Narrative:     add on mg per Nicholas Avelar MD order# [id number] 08/07/2023 @ 15:29     add on troponin & tsh per Bowen Sepulveda DO order# [id number]   [id number] 08/07/2023 @ 16:18      EKG Readings: (Independently Interpreted)   Ekg: new onset aflutter       Imaging Results              X-Ray Chest AP Portable (Final result)  Result time 08/07/23 16:40:29      Final result by Tomas Harris MD (08/07/23 16:40:29)                   Impression:      1. Prominent interstitium with bilateral lower lobe hazy opacities suspicious for pulmonary edema in the setting of cardiomegaly.      Electronically signed by: Tomas Zamarripa  Date:    08/07/2023  Time:    16:40               Narrative:    EXAMINATION:  XR CHEST AP PORTABLE    CLINICAL  HISTORY:  new wheeze/concern for aspiration;    TECHNIQUE:  XR CHEST AP PORTABLE    COMPARISON:  04/27/2023    FINDINGS:  Support devices, tubes and lines: None    Lungs/airways, pleura:Prominent interstitium and hazy opacities in the lower lobes suspicious for pulmonary edema.  No consolidations.No pleural effusion No pneumothorax    Heart/mediastinum: Cardiomegaly calcified aorta.    Osseous Structures:  Unremarkable                                       Medications - No data to display  Medical Decision Making:   History:   Old Medical Records: I decided to obtain old medical records.  Old Records Summarized: records from clinic visits.       <> Summary of Records: 7/20 - General Neurology - F/u appt for LBD. Decreased dosage of seroquel  Initial Assessment:   This is a 77 y/o male with PMH of LBD, HTN, HLD, and bladder stim placement that presents with episodic wheezing and concern for urinary retention. We will be investigating UA to assess for UTI, CBC for leukocytosis, CMP for electrolyte derangement. EKG on admission shows new onset of atrial flutter; no prior studies exhibit this abnormality.  Differential Diagnosis:   UTI  Pneumonia  Bronchitis  Pyelonephritis  BPH  Independently Interpreted Test(s):   I have ordered and independently interpreted X-rays - see summary below.       <> Summary of X-Ray Reading(s): Increased congestion in bilateral pulmonary vasculature. No focal consolidation.  I have ordered and independently interpreted EKG Reading(s) - see summary below       <> Summary of EKG Reading(s): New onset atrial flutter with bradycardia and prolonged QT interval    Ventricular rate of 42 bpm  DC interval unable to measure due to atrial flutter  QT prolonged at interval of 506    Clinical Tests:   Lab Tests: Ordered and Reviewed  The following lab test(s) were unremarkable: CBC, CMP and Troponin       <> Summary of Lab: CBC without leukocytosis or acute anemia  CMP without acute  abnormality  Troponin wnl  UA without acute abnormality  Radiological Study: Ordered and Reviewed  Medical Tests: Ordered and Reviewed  ED Management:  Initial EKG concerning for new onset atrial flutter. Repeating EKG.    Bloodwork/UA unremarkable. Repeat EKG consistent with new onset atrial flutter. CXR with increase in pulmonary vascular congestion, concerning for symptomatic new onset atrial flutter.    Recommending admission for inpatient cardiology consultation.    Handoff provided to Dr. Bob            Attending Attestation:             Attending ED Notes:   Attending Note:  I have seen the patient, have repeated the key portions of the history and physical, reviewed and agree with the medical documentation, and supervised and managed the medical care of the patient. Additionally, I was present for the critical portion of any procedure(s) performed.      78 M here for sob, intermittent wheezing noted by wife since this AM.  Also reports decreased urine output.  No fever or chills.    Well appearing, no respiratory distress  Lungs CTA  Abd soft nontender  Labs, CXR, EKG ordered  EKG concerning for new onset A flutter with rate of 44.  Turned over to oncoming team pending labs, cxr and final dispo    GIOVANNI Blanco MD  Staff ED Physician                           Clinical Impression:   Final diagnoses:  [I48.92] New onset atrial flutter  [R06.2] Wheezing (Primary)  [R00.1] Bradycardia        ED Disposition Condition    Observation Stable                Nicholas Avelar MD  Resident  08/07/23 [id number]       Trinidad Blanco MD  08/08/23 [id number]     [Joint Pain] : joint pain [Negative] : Heme/Lymph

## 2024-01-15 NOTE — PROGRESS NOTE ADULT - SUBJECTIVE AND OBJECTIVE BOX
3rd call to patient to update that his medication was received at Dr. Dickey office.    No answer.  Message was left for return call to the office.   Call back number provided    Unable to reach letter sent to patient via mail  
Post-op Check   POD#0 S/P Left AUGUSTUS Revision  35yFemale Patient seen and examined, Pain controlled  Patient Denies SOB, CP, N/V/D       PE: Left Hip/LE: Dressing C/D/I, Sensation/motor intact, DP 2+, FROM ankle/toes    B/L LE: Skin intact. +ROM hip/knee/ankle/toes. Ankle Dorsi/plantarflexion: 5/5. Calf: soft, compressible and nontender. DP/PT 2+ NVI                          10.6   9.42  )-----------( 216      ( 21 Jan 2021 12:07 )             29.4       01-21    140  |  109<H>  |  15  ----------------------------<  88  4.3   |  26  |  0.55    Ca    8.5      21 Jan 2021 12:07          A: As above   P: Pain Control       DVT Prophylaxis      Incentive spirometry      Total hip precautions Reviewed       PT WBAT LLE      Isometric exercises      Discharge Planning      All the above discussed and understood by pt       Ortho to F/U 
LARISSA EDWARDS is a 35y Female s/p EXTENSIVE SURGERY    LEFT TOTAL HIP ARTHROPLASTY REVISION        denies any chest pain shortness of breath palpitation dizziness lightheadedness nausea vomiting fever or chills    T(C): 37 (01-22-21 @ 09:53), Max: 37 (01-22-21 @ 09:53)  HR: 63 (01-22-21 @ 09:53) (50 - 63)  BP: 133/70 (01-22-21 @ 09:53) (97/64 - 144/53)  RR: 17 (01-22-21 @ 09:53) (17 - 18)  SpO2: 98% (01-22-21 @ 09:53) (97% - 99%)  no jvd/bruit  s1 s2 rrr  cta  s/nt/nd  no calf tend                        9.7    14.83 )-----------( 224      ( 22 Jan 2021 06:46 )             27.0   01-22    138  |  105  |  8   ----------------------------<  85  3.7   |  28  |  0.47<L>    Ca    8.9      22 Jan 2021 06:46        cont dvt px  pain control  bowel regimen  antiemetics  incentive spirometer
Patient is seen and examined at bedside with Dr Navarro. Denies CP/SOB/Dizziness/N/V/D/HA. Pain is controlled.     Vital Signs Last 24 Hrs  T(C): 36.8 (22 Jan 2021 05:40), Max: 36.9 (21 Jan 2021 14:50)  T(F): 98.2 (22 Jan 2021 05:40), Max: 98.5 (21 Jan 2021 14:50)  HR: 50 (22 Jan 2021 05:40) (43 - 62)  BP: 123/75 (22 Jan 2021 05:40) (97/64 - 144/53)  BP(mean): --  RR: 18 (22 Jan 2021 05:40) (13 - 22)  SpO2: 98% (22 Jan 2021 05:40) (97% - 100%)    GEN: NAD  ABD: soft, NT/ND; no rebound or guarding;  Neurologic: AAOx3; CNS grossly intact; no focal deficits  RLE:  Motor intact + EHL/FHL/TA/GS. Sensation is grossly intact.  Extremities warm. . Compartments soft, compressible. No calf tenderness. DP 2+.  LLE: Prineo dressing C/D/I.  Motor intact + EHL/FHL/TA/GS. Sensation is grossly intact. Extremities warm. Compartments soft, compressible. No calf tenderness.. DP 2+.    Labs:                          9.7    14.83 )-----------( 224      ( 22 Jan 2021 06:46 )             27.0       01-22    138  |  105  |  8   ----------------------------<  85  3.7   |  28  |  0.47<L>    Ca    8.9      22 Jan 2021 06:46        A/P: Patient is a 35y y/o Female s/p revision L AUGUSTUS, POD #1   -wound care, isometric exercises, GI motility, new medications, STRICT hip precautions,  hospital course and discharge planning reviewed with pt  -Pain control/analgesia discussed  -Inc spirometry reviewed and counseled  -Venodynes/foot pumps  -PT/OT/WBAT  -Anticoagulation asa 81mg BID  -DC plan: home today  -Pt seen in am with Dr Navarro

## 2024-03-06 NOTE — OCCUPATIONAL THERAPY INITIAL EVALUATION ADULT - LEVEL OF INDEPENDENCE: DRESS UPPER BODY, OT EVAL
Spoke with Samantha and she said she only has enough vilazodone to get her through Thursday 3/7/24. Can she get a short supply until her appt?   Next OV 3/12/24 with you.  
independent

## 2024-03-13 NOTE — ED ADULT NURSE NOTE - CAS DISC DELAYS
Constitutional:       General: She is active. She is not in acute distress.     Appearance: She is well-developed. She is not diaphoretic.   HENT:      Head: Normocephalic and atraumatic.      Right Ear: Tympanic membrane normal.      Left Ear: A middle ear effusion is present.      Mouth/Throat:      Mouth: Mucous membranes are moist.      Pharynx: Oropharynx is clear. Posterior oropharyngeal erythema present.   Eyes:      General:         Right eye: No discharge.         Left eye: No discharge.   Cardiovascular:      Rate and Rhythm: Normal rate and regular rhythm.      Heart sounds: No murmur heard.  Pulmonary:      Effort: Pulmonary effort is normal. No respiratory distress or retractions.      Breath sounds: Normal breath sounds. No wheezing.   Musculoskeletal:      Cervical back: Normal range of motion.   Lymphadenopathy:      Cervical: Cervical adenopathy present.   Skin:     General: Skin is warm and moist.      Findings: No rash.   Neurological:      Mental Status: She is alert.       Pulse 87   Temp 99.1 °F (37.3 °C) (Tympanic)   Ht 1.232 m (4' 0.5\")   Wt 25.4 kg (56 lb)   SpO2 98%   BMI 16.74 kg/m²     Negative POCT strep    Assessment:       Diagnosis Orders   1. Pharyngitis, unspecified etiology  azithromycin (ZITHROMAX) 200 MG/5ML suspension      2. Sore throat  POCT rapid strep A        Plan:      Based on the clinical exam findings-- I will treat this as a bacterial pharyngitis despite the negative rapid strep.  Patient's father instructed to complete entire antibiotic course.  Tylenol/Motrin as needed for fever/discomfort.  Salt water gargles and throat lozenges if desired.  Change toothbrush in 24 hours.  Note for school absence provided.  Patient's father agreeable to treatment plan.  Educational materials provided on AVS.  Follow up if symptoms do not improve/worsen.    Orders Placed This Encounter   Medications    azithromycin (ZITHROMAX) 200 MG/5ML suspension     Sig: Take 7.5 mLs by  Waiting ambulance service

## 2024-04-22 NOTE — ED PROVIDER NOTE - SEVERITY
Discontinue Zyrtec daily    Trial of plain Zyrtec 10 mg or Allegra 180 mg    Ferrous gluconate once daily and if tolerates twice daily    Resume birth control pill    If symptoms persist check labs for tachycardia and fatigue    Propranolol 10 mg every 8 hours as needed if systolic blood pressure greater than 100    If chest pain persists after another 2 to 3 days proceed to upper GI endoscopy    Trial again to decrease omeprazole to once daily after 2 to 3 days    Resume sertraline when stable after a week or 2 of iron replacement    Resume lamotrigine after sertraline but twice daily   MILD

## 2024-08-16 ENCOUNTER — NON-APPOINTMENT (OUTPATIENT)
Age: 39
End: 2024-08-16

## 2025-02-17 NOTE — ED ADULT TRIAGE NOTE - BSA (M2)
RRT called - Pt family at bedside and put on call light. Writer answered and pt vomiting. Suction had been set up and on at bedside, pt vomiting large amount of tan/brown thin bile like substance.  HOB elevated, o2 sats dropped. G-Tube opened.  RRT called r/t low o2 despite maxing out oxymask on 15L. Gave zofran and lasix per House NP, started on High flow NC. Planing to transfer to ICU.    1.69

## 2025-03-20 NOTE — ASU PREOP CHECKLIST - AS TEMP SITE
Caller: Coni Cleary    Relationship: Self    Best call back number: [phone]     What medication are you requesting:  Z PACK     What are your current symptoms: SORE THROAT , COUGH, RIGHT EAR PAIN     How long have you been experiencing symptoms: 24HR     Have you had these symptoms before:    [x] Yes  [] No    Have you been treated for these symptoms before:   [x] Yes  [] No    If a prescription is needed, what is your preferred pharmacy and phone number: [address] [phone] [facility] [phone] FX     Additional notes:         
Spoke with patient to inform her that her provider is out of the office today and per our office policy our providers are unable to prescribe any medication without seeing the patient in office for an evaluation first. Advised patient to go to the UC or ER for eval or contact office tomorrow morning for a possible appointment. Patient voiced understanding and had no further questions  
oral

## 2025-05-06 NOTE — OCCUPATIONAL THERAPY INITIAL EVALUATION ADULT - VISUAL ASSESSMENT: TRACKING
I reviewed the H&P (in EPIC, printed copy, or short form) and interviewed the patient.  The patient denies any changes in health or medications since this H&P.    
normal

## 2025-05-20 NOTE — ED PROVIDER NOTE - RESPIRATORY, MLM
Mercy Doug Ferrera MD  Professor of Medicine  [facility]  Division of Gastroenterology, Hepatology, and Nutrition    Breath sounds clear and equal bilaterally.

## 2025-07-03 NOTE — ED ADULT NURSE NOTE - TEMPLATE
See Romo APNP Serrano, Linda, MD; SHIRLEY Griffin Msg Pool  Caller: Unspecified (Today, 11:41 AM)  I also have had good success with a slow up-titration.    If previously was taking 300 mg once daily.  Take 400 mg twice daily for 7 days.  If tolerated and no side effects and still no symptomatic improvement, take 400 mg three times daily.    If he cannot tolerate this dose we could send 100 mg capsules/tablets and recommend an even slower up titration.    YINA Sanchez  Dermatology   Cold/Sinus

## 2025-07-22 NOTE — ED PROVIDER NOTE - CROS ED CONS ALL NEG
[Nasal Discharge] : nasal discharge [Postnasal Drip] : postnasal drip [Cough] : cough [Negative] : Respiratory negative...